# Patient Record
Sex: MALE | Race: WHITE | Employment: FULL TIME | URBAN - METROPOLITAN AREA
[De-identification: names, ages, dates, MRNs, and addresses within clinical notes are randomized per-mention and may not be internally consistent; named-entity substitution may affect disease eponyms.]

---

## 2017-02-10 ENCOUNTER — TELEPHONE (OUTPATIENT)
Dept: CARDIOTHORACIC SURGERY | Age: 57
End: 2017-02-10

## 2017-02-10 ENCOUNTER — HOSPITAL ENCOUNTER (OUTPATIENT)
Dept: VASCULAR LAB | Age: 57
Discharge: HOME | End: 2017-02-10
Attending: SURGERY | Admitting: SURGERY

## 2017-02-10 DIAGNOSIS — I65.23 OCCLUSION AND STENOSIS OF BILATERAL CAROTID ARTERIES: ICD-10-CM

## 2017-02-10 DIAGNOSIS — I65.23 CAROTID ATHEROSCLEROSIS, BILATERAL: Primary | ICD-10-CM

## 2017-02-16 ENCOUNTER — TELEPHONE (OUTPATIENT)
Dept: VASCULAR SURGERY | Age: 57
End: 2017-02-16

## 2017-02-22 ENCOUNTER — OFFICE VISIT (OUTPATIENT)
Dept: PRIMARY CARE CLINIC | Age: 57
End: 2017-02-22

## 2017-02-22 VITALS
SYSTOLIC BLOOD PRESSURE: 130 MMHG | HEART RATE: 63 BPM | TEMPERATURE: 98.3 F | BODY MASS INDEX: 30.7 KG/M2 | WEIGHT: 196 LBS | DIASTOLIC BLOOD PRESSURE: 70 MMHG | RESPIRATION RATE: 18 BRPM | OXYGEN SATURATION: 95 %

## 2017-02-22 DIAGNOSIS — I25.10 CORONARY ARTERY DISEASE WITHOUT ANGINA PECTORIS, UNSPECIFIED VESSEL OR LESION TYPE, UNSPECIFIED WHETHER NATIVE OR TRANSPLANTED HEART: ICD-10-CM

## 2017-02-22 DIAGNOSIS — E78.2 MIXED HYPERLIPIDEMIA: ICD-10-CM

## 2017-02-22 DIAGNOSIS — F17.200 SMOKER: ICD-10-CM

## 2017-02-22 DIAGNOSIS — G44.52 NEW DAILY PERSISTENT HEADACHE: Primary | ICD-10-CM

## 2017-02-22 PROCEDURE — 99214 OFFICE O/P EST MOD 30 MIN: CPT | Performed by: INTERNAL MEDICINE

## 2017-02-22 RX ORDER — DOXYCYCLINE HYCLATE 100 MG
100 TABLET ORAL 2 TIMES DAILY
Qty: 20 TABLET | Refills: 0 | Status: SHIPPED | OUTPATIENT
Start: 2017-02-22 | End: 2017-03-04

## 2017-02-22 RX ORDER — DOXYCYCLINE HYCLATE 100 MG
100 TABLET ORAL 2 TIMES DAILY
Qty: 20 TABLET | Refills: 0 | Status: SHIPPED | OUTPATIENT
Start: 2017-02-22 | End: 2017-02-22 | Stop reason: SDUPTHER

## 2017-02-22 RX ORDER — FLUTICASONE PROPIONATE 50 MCG
1 SPRAY, SUSPENSION (ML) NASAL DAILY
Qty: 1 BOTTLE | Refills: 0 | Status: SHIPPED | OUTPATIENT
Start: 2017-02-22 | End: 2017-04-03 | Stop reason: SDUPTHER

## 2017-02-22 RX ORDER — FLUTICASONE PROPIONATE 50 MCG
1 SPRAY, SUSPENSION (ML) NASAL DAILY
Qty: 1 BOTTLE | Refills: 0 | Status: SHIPPED | OUTPATIENT
Start: 2017-02-22 | End: 2017-02-22 | Stop reason: SDUPTHER

## 2017-02-22 ASSESSMENT — ENCOUNTER SYMPTOMS
CHEST TIGHTNESS: 0
EYES NEGATIVE: 1
BLOOD IN STOOL: 0
WHEEZING: 0
GASTROINTESTINAL NEGATIVE: 1
CONSTIPATION: 0
DIARRHEA: 0
SHORTNESS OF BREATH: 0
COUGH: 0

## 2017-02-22 ASSESSMENT — PATIENT HEALTH QUESTIONNAIRE - PHQ9
SUM OF ALL RESPONSES TO PHQ QUESTIONS 1-9: 0
SUM OF ALL RESPONSES TO PHQ9 QUESTIONS 1 & 2: 0
1. LITTLE INTEREST OR PLEASURE IN DOING THINGS: 0
2. FEELING DOWN, DEPRESSED OR HOPELESS: 0

## 2017-03-21 RX ORDER — ESCITALOPRAM OXALATE 20 MG/1
TABLET ORAL
Qty: 30 TABLET | Refills: 0 | Status: SHIPPED | OUTPATIENT
Start: 2017-03-21 | End: 2017-04-25 | Stop reason: SDUPTHER

## 2017-04-03 DIAGNOSIS — G44.52 NEW DAILY PERSISTENT HEADACHE: ICD-10-CM

## 2017-04-03 RX ORDER — AMOXICILLIN 500 MG/1
500 CAPSULE ORAL 3 TIMES DAILY
Qty: 30 CAPSULE | Refills: 0 | Status: SHIPPED | OUTPATIENT
Start: 2017-04-03 | End: 2017-04-13

## 2017-04-03 RX ORDER — FLUTICASONE PROPIONATE 50 MCG
1 SPRAY, SUSPENSION (ML) NASAL DAILY
Qty: 1 BOTTLE | Refills: 0 | Status: SHIPPED | OUTPATIENT
Start: 2017-04-03 | End: 2020-01-09 | Stop reason: ALTCHOICE

## 2017-04-25 RX ORDER — ESCITALOPRAM OXALATE 20 MG/1
TABLET ORAL
Qty: 30 TABLET | Refills: 0 | Status: SHIPPED | OUTPATIENT
Start: 2017-04-25 | End: 2017-05-29 | Stop reason: SDUPTHER

## 2017-05-30 RX ORDER — ESCITALOPRAM OXALATE 20 MG/1
TABLET ORAL
Qty: 30 TABLET | Refills: 0 | Status: SHIPPED | OUTPATIENT
Start: 2017-05-30 | End: 2017-06-28 | Stop reason: SDUPTHER

## 2017-06-06 DIAGNOSIS — E78.5 HYPERLIPIDEMIA: ICD-10-CM

## 2017-06-08 RX ORDER — EZETIMIBE 10 MG/1
TABLET ORAL
Qty: 90 TABLET | Refills: 0 | Status: SHIPPED | OUTPATIENT
Start: 2017-06-08 | End: 2017-09-20 | Stop reason: SDUPTHER

## 2017-06-19 DIAGNOSIS — E78.5 HYPERLIPIDEMIA: ICD-10-CM

## 2017-06-19 RX ORDER — METFORMIN HYDROCHLORIDE 500 MG/1
TABLET, EXTENDED RELEASE ORAL
Qty: 30 TABLET | Refills: 3 | Status: SHIPPED | OUTPATIENT
Start: 2017-06-19 | End: 2018-02-14 | Stop reason: SDUPTHER

## 2017-06-20 RX ORDER — EZETIMIBE 10 MG/1
TABLET ORAL
Qty: 90 TABLET | Refills: 0 | Status: SHIPPED | OUTPATIENT
Start: 2017-06-20 | End: 2017-09-20

## 2017-06-21 DIAGNOSIS — E78.5 HYPERLIPIDEMIA: ICD-10-CM

## 2017-06-22 RX ORDER — EZETIMIBE 10 MG/1
TABLET ORAL
Qty: 90 TABLET | Refills: 0 | Status: SHIPPED | OUTPATIENT
Start: 2017-06-22 | End: 2020-01-09 | Stop reason: SDUPTHER

## 2017-06-28 RX ORDER — ESCITALOPRAM OXALATE 20 MG/1
TABLET ORAL
Qty: 30 TABLET | Refills: 0 | Status: SHIPPED | OUTPATIENT
Start: 2017-06-28 | End: 2017-07-27 | Stop reason: SDUPTHER

## 2017-07-28 RX ORDER — ESCITALOPRAM OXALATE 20 MG/1
TABLET ORAL
Qty: 30 TABLET | Refills: 0 | Status: SHIPPED | OUTPATIENT
Start: 2017-07-28 | End: 2017-08-22 | Stop reason: SDUPTHER

## 2017-08-09 DIAGNOSIS — F41.9 ANXIETY AND DEPRESSION: ICD-10-CM

## 2017-08-09 DIAGNOSIS — F32.A ANXIETY AND DEPRESSION: ICD-10-CM

## 2017-08-09 RX ORDER — ESCITALOPRAM OXALATE 10 MG/1
10 TABLET ORAL DAILY
Qty: 90 TABLET | Refills: 3 | Status: SHIPPED | OUTPATIENT
Start: 2017-08-09 | End: 2017-08-22 | Stop reason: DRUGHIGH

## 2017-08-09 RX ORDER — ESCITALOPRAM OXALATE 20 MG/1
20 TABLET ORAL DAILY
Qty: 90 TABLET | Refills: 5 | Status: SHIPPED | OUTPATIENT
Start: 2017-08-09 | End: 2018-12-24

## 2017-08-10 DIAGNOSIS — F32.A ANXIETY AND DEPRESSION: ICD-10-CM

## 2017-08-10 DIAGNOSIS — F41.9 ANXIETY AND DEPRESSION: ICD-10-CM

## 2017-08-10 RX ORDER — ESCITALOPRAM OXALATE 10 MG/1
TABLET ORAL
Qty: 90 TABLET | Refills: 1 | OUTPATIENT
Start: 2017-08-10

## 2017-08-15 DIAGNOSIS — F32.A ANXIETY AND DEPRESSION: ICD-10-CM

## 2017-08-15 DIAGNOSIS — F41.9 ANXIETY AND DEPRESSION: ICD-10-CM

## 2017-08-16 RX ORDER — ESCITALOPRAM OXALATE 10 MG/1
TABLET ORAL
Qty: 90 TABLET | Refills: 1 | OUTPATIENT
Start: 2017-08-16

## 2017-09-11 LAB
A/G RATIO: 1.5 (ref 1.1–2.2)
ALBUMIN SERPL-MCNC: 4.3 G/DL (ref 3.4–5)
ALP BLD-CCNC: 78 U/L (ref 40–129)
ALT SERPL-CCNC: 14 U/L (ref 10–40)
ANION GAP SERPL CALCULATED.3IONS-SCNC: 23 MMOL/L (ref 3–16)
AST SERPL-CCNC: 15 U/L (ref 15–37)
BASOPHILS ABSOLUTE: 0.1 K/UL (ref 0–0.2)
BASOPHILS RELATIVE PERCENT: 1.2 %
BILIRUB SERPL-MCNC: 0.3 MG/DL (ref 0–1)
BUN BLDV-MCNC: 23 MG/DL (ref 7–20)
CALCIUM SERPL-MCNC: 9.6 MG/DL (ref 8.3–10.6)
CHLORIDE BLD-SCNC: 102 MMOL/L (ref 99–110)
CO2: 17 MMOL/L (ref 21–32)
CREAT SERPL-MCNC: 0.9 MG/DL (ref 0.9–1.3)
EOSINOPHILS ABSOLUTE: 0.2 K/UL (ref 0–0.6)
EOSINOPHILS RELATIVE PERCENT: 2 %
GFR AFRICAN AMERICAN: >60
GFR NON-AFRICAN AMERICAN: >60
GLOBULIN: 2.8 G/DL
GLUCOSE BLD-MCNC: 144 MG/DL (ref 70–99)
HCT VFR BLD CALC: 45.4 % (ref 40.5–52.5)
HEMOGLOBIN: 15.4 G/DL (ref 13.5–17.5)
LYMPHOCYTES ABSOLUTE: 2.9 K/UL (ref 1–5.1)
LYMPHOCYTES RELATIVE PERCENT: 31.2 %
MCH RBC QN AUTO: 32.3 PG (ref 26–34)
MCHC RBC AUTO-ENTMCNC: 33.8 G/DL (ref 31–36)
MCV RBC AUTO: 95.5 FL (ref 80–100)
MONOCYTES ABSOLUTE: 0.9 K/UL (ref 0–1.3)
MONOCYTES RELATIVE PERCENT: 9.5 %
NEUTROPHILS ABSOLUTE: 5.3 K/UL (ref 1.7–7.7)
NEUTROPHILS RELATIVE PERCENT: 56.1 %
PDW BLD-RTO: 14.9 % (ref 12.4–15.4)
PLATELET # BLD: 255 K/UL (ref 135–450)
PMV BLD AUTO: 7.8 FL (ref 5–10.5)
POTASSIUM SERPL-SCNC: 4.1 MMOL/L (ref 3.5–5.1)
RBC # BLD: 4.75 M/UL (ref 4.2–5.9)
SODIUM BLD-SCNC: 142 MMOL/L (ref 136–145)
TOTAL PROTEIN: 7.1 G/DL (ref 6.4–8.2)
WBC # BLD: 9.4 K/UL (ref 4–11)

## 2017-09-12 LAB
ESTIMATED AVERAGE GLUCOSE: 168.6 MG/DL
HBA1C MFR BLD: 7.5 %

## 2017-09-20 ENCOUNTER — OFFICE VISIT (OUTPATIENT)
Dept: PRIMARY CARE CLINIC | Age: 57
End: 2017-09-20

## 2017-09-20 VITALS
HEART RATE: 67 BPM | RESPIRATION RATE: 16 BRPM | DIASTOLIC BLOOD PRESSURE: 71 MMHG | WEIGHT: 199 LBS | OXYGEN SATURATION: 97 % | TEMPERATURE: 98.2 F | BODY MASS INDEX: 31.23 KG/M2 | SYSTOLIC BLOOD PRESSURE: 112 MMHG | HEIGHT: 67 IN

## 2017-09-20 DIAGNOSIS — F41.9 ANXIETY: ICD-10-CM

## 2017-09-20 DIAGNOSIS — I25.10 CORONARY ARTERY DISEASE WITHOUT ANGINA PECTORIS, UNSPECIFIED VESSEL OR LESION TYPE, UNSPECIFIED WHETHER NATIVE OR TRANSPLANTED HEART: ICD-10-CM

## 2017-09-20 DIAGNOSIS — I10 ESSENTIAL HYPERTENSION: Primary | ICD-10-CM

## 2017-09-20 DIAGNOSIS — E78.2 MIXED HYPERLIPIDEMIA: ICD-10-CM

## 2017-09-20 DIAGNOSIS — E11.9 TYPE 2 DIABETES MELLITUS WITHOUT COMPLICATION, WITHOUT LONG-TERM CURRENT USE OF INSULIN (HCC): ICD-10-CM

## 2017-09-20 PROCEDURE — 99214 OFFICE O/P EST MOD 30 MIN: CPT | Performed by: NURSE PRACTITIONER

## 2017-09-20 RX ORDER — ASPIRIN 81 MG/1
81 TABLET ORAL DAILY
Qty: 90 TABLET | Refills: 0 | Status: SHIPPED | OUTPATIENT
Start: 2017-09-20 | End: 2017-10-15 | Stop reason: SDUPTHER

## 2017-09-20 RX ORDER — ROSUVASTATIN CALCIUM 40 MG/1
40 TABLET, COATED ORAL EVERY EVENING
Qty: 90 TABLET | Refills: 0 | Status: SHIPPED | OUTPATIENT
Start: 2017-09-20 | End: 2018-05-02 | Stop reason: SDUPTHER

## 2017-09-20 RX ORDER — ESCITALOPRAM OXALATE 10 MG/1
10 TABLET ORAL DAILY
Qty: 90 TABLET | Refills: 0 | Status: SHIPPED | OUTPATIENT
Start: 2017-09-20 | End: 2017-12-15 | Stop reason: SDUPTHER

## 2017-09-20 RX ORDER — METFORMIN HYDROCHLORIDE 500 MG/1
1000 TABLET, EXTENDED RELEASE ORAL
Qty: 180 TABLET | Refills: 0 | Status: SHIPPED | OUTPATIENT
Start: 2017-09-20 | End: 2018-05-31 | Stop reason: SDUPTHER

## 2017-09-20 RX ORDER — CLOPIDOGREL BISULFATE 75 MG/1
75 TABLET ORAL DAILY
Qty: 90 TABLET | Refills: 0 | Status: SHIPPED | OUTPATIENT
Start: 2017-09-20 | End: 2017-10-15 | Stop reason: SDUPTHER

## 2017-09-20 RX ORDER — EZETIMIBE 10 MG/1
TABLET ORAL
Qty: 90 TABLET | Refills: 0 | Status: SHIPPED | OUTPATIENT
Start: 2017-09-20 | End: 2018-02-07 | Stop reason: SDUPTHER

## 2017-09-20 RX ORDER — ESCITALOPRAM OXALATE 20 MG/1
TABLET ORAL
Qty: 90 TABLET | Refills: 0 | Status: SHIPPED | OUTPATIENT
Start: 2017-09-20 | End: 2018-12-24

## 2017-09-20 RX ORDER — ATENOLOL 50 MG/1
50 TABLET ORAL DAILY
Qty: 90 TABLET | Refills: 0 | Status: SHIPPED | OUTPATIENT
Start: 2017-09-20 | End: 2017-10-15 | Stop reason: SDUPTHER

## 2017-09-20 ASSESSMENT — ENCOUNTER SYMPTOMS
CHEST TIGHTNESS: 0
WHEEZING: 0
PHOTOPHOBIA: 0
EYES NEGATIVE: 1
SHORTNESS OF BREATH: 0
BLOOD IN STOOL: 0
GASTROINTESTINAL NEGATIVE: 1
DIARRHEA: 0
COUGH: 0
CONSTIPATION: 0

## 2017-10-05 DIAGNOSIS — F41.9 ANXIETY AND DEPRESSION: ICD-10-CM

## 2017-10-05 DIAGNOSIS — F32.A ANXIETY AND DEPRESSION: ICD-10-CM

## 2017-10-06 ENCOUNTER — HOSPITAL ENCOUNTER (OUTPATIENT)
Dept: VASCULAR LAB | Age: 57
Discharge: HOME | End: 2017-10-06
Attending: SURGERY | Admitting: SURGERY

## 2017-10-06 ENCOUNTER — OFFICE VISIT (OUTPATIENT)
Dept: VASCULAR SURGERY | Age: 57
End: 2017-10-06

## 2017-10-06 VITALS
DIASTOLIC BLOOD PRESSURE: 70 MMHG | HEIGHT: 67 IN | WEIGHT: 199 LBS | SYSTOLIC BLOOD PRESSURE: 136 MMHG | BODY MASS INDEX: 31.23 KG/M2

## 2017-10-06 DIAGNOSIS — I65.23 CAROTID ATHEROSCLEROSIS, BILATERAL: Primary | ICD-10-CM

## 2017-10-06 DIAGNOSIS — I65.23 OCCLUSION AND STENOSIS OF BILATERAL CAROTID ARTERIES: ICD-10-CM

## 2017-10-06 PROCEDURE — 99999 PR OFFICE/OUTPT VISIT,PROCEDURE ONLY: CPT | Performed by: SURGERY

## 2017-10-06 NOTE — MR AVS SNAPSHOT
After Visit Summary             Germaine Lozada   10/6/2017 9:30 AM   Office Visit    Description:  Male : 1960   Provider:  Dionisio Haddad MD   Department:  [facility] FF VASC & ENDO              Your Follow-Up and Future Appointments         Below is a list of your follow-up and future appointments. This may not be a complete list as you may have made appointments directly with providers that we are not aware of or your providers may have made some for you. Please call your providers to confirm appointments. It is important to keep your appointments. Please bring your current insurance card, photo ID, co-pay, and all medication bottles to your appointment. If self-pay, payment is expected at the time of service. Your To-Do List     Future Orders Complete By Expires    VL DUP CAROTID BILATERAL [20287 Custom]  2018 10/6/2018         Information from Your Visit        Department     Name Address Phone Fax    [facility] [address] [phone]      You Were Seen for:         Comments    Carotid atherosclerosis, bilateral   [[id number]]         Vital Signs     Blood Pressure Height Weight Body Mass Index Smoking Status       136/70 5' 7\" (1.702 m) 199 lb (90.3 kg) 31.17 kg/m2 Current Every Day Smoker       Additional Information about your Body Mass Index (BMI)           Your BMI as listed above is considered obese (30 or more). BMI is an estimate of body fat, calculated from your height and weight. The higher your BMI, the greater your risk of heart disease, high blood pressure, type 2 diabetes, stroke, gallstones, arthritis, sleep apnea, and certain cancers. BMI is not perfect. It may overestimate body fat in athletes and people who are more muscular.   Even a small weight loss (between 5 and 10 percent of your current weight) by decreasing your calorie intake and becoming more physically active will help lower your risk of developing or factors born between [facility] at least once (lifetime) who have never been tested. 1960    Diabetic Foot Exam 12/22/1972    Eye Exam By An Eye Doctor 12/22/1972    HIV screening is recommended for all people regardless of risk factors  aged 15-65 years at least once (lifetime) who have never been HIV tested. 12/22/1977    Urine Check For Kidney Problems 12/22/1980    Tetanus Combination Vaccine (1 - Tdap) 12/22/1981    Pneumococcal Vaccine - Pneumovax for adults aged 19-64 years with: chronic heart disease, chronic lung disease, diabetes mellitus, alcoholism, chronic liver disease, or cigarette smoking. (1 of 1 - PPSV23) 12/22/1981    Cholesterol Screening 5/23/2017    Colon Cancer Stool Test 5/24/2017    Yearly Flu Vaccine (1) 9/1/2017    Hemoglobin A1C (Test For Long-Term Glucose Control) 9/11/2018            [employer] Signup           [employer]ageCrowd allows you to send messages to your doctor, view your test results, renew your prescriptions, schedule appointments, view visit notes, and more. How Do I Sign Up? 1. In your Internet browser, go to [email or web address]  2. Click on the Sign Up Now link in the Sign In box. You will see the New Member Sign Up page. 3. Enter your sageCrowd Access Code exactly as it appears below. You will not need to use this code after youve completed the sign-up process. If you do not sign up before the expiration date, you must request a new code. [employer]d Access Code: [email or web address]  Expires: 12/5/2017 10:00 AM    4. Enter your Social Security Number ([id number]) and Date of Birth (mm/dd/yyyy) as indicated and click Submit. You will be taken to the next sign-up page. 5. Create a sageCrowd ID. This will be your sageCrowd login ID and cannot be changed, so think of one that is secure and easy to remember. 6. Create a [employer]d password. You can change your password at any time. 7. Enter your Password Reset Question and Answer.  This can be used at a

## 2017-10-06 NOTE — PROGRESS NOTES
[facility])   Vascular Surgery Followup    Referring Provider:  James Smith MD     Chief Complaint   Patient presents with    Follow-up        History of Present Illness:   29-year-old male with history of left subclavian stenosis and stent placement in September 2011. He also has a known history of carotid atherosclerosis. Unable to obtain ultrasound secondary to insurance regulations. No new complaints. Past Medical History:   has a past medical history of Anxiety; CAD (coronary artery disease); Hyperlipidemia; Hypertension; and PVD (peripheral vascular disease) ([address] Surgical History:   has a past surgical history that includes Coronary angioplasty. Social History:   reports that he has been smoking. He has a 10.00 pack-year smoking history. He uses smokeless tobacco. He reports that he does not drink alcohol or use illicit drugs. Family History:  family history includes Heart Disease in his father; High Blood Pressure in his father and mother; High Cholesterol in his father.      Home Medications:  Current Outpatient Prescriptions   Medication Sig Dispense Refill    escitalopram (LEXAPRO) 20 MG tablet TAKE 1 TABLET BY MOUTH DAILY WITH A 10 MG TABLET TO EQUAL 30 MG DAILY 90 tablet 0    escitalopram (LEXAPRO) 10 MG tablet Take 1 tablet by mouth daily TAKE 1 TABLET BY MOUTH DAILY WITH A 20 MG TABLET TO EQUAL 30 MG DAILY 90 tablet 0    rosuvastatin (CRESTOR) 40 MG tablet Take 1 tablet by mouth every evening 90 tablet 0    aspirin 81 MG EC tablet Take 1 tablet by mouth daily 90 tablet 0    atenolol (TENORMIN) 50 MG tablet Take 1 tablet by mouth daily 90 tablet 0    ezetimibe (ZETIA) 10 MG tablet TAKE 1 TABLET BY MOUTH EVERY DAY 90 tablet 0    metFORMIN (GLUCOPHAGE XR) 500 MG extended release tablet Take 2 tablets by mouth daily (with breakfast) 180 tablet 0    clopidogrel (PLAVIX) 75 MG tablet Take 1 tablet by mouth daily 90 tablet 0    escitalopram (LEXAPRO) 20 MG tablet Take 1

## 2017-10-15 DIAGNOSIS — I25.10 CORONARY ARTERY DISEASE WITHOUT ANGINA PECTORIS, UNSPECIFIED VESSEL OR LESION TYPE, UNSPECIFIED WHETHER NATIVE OR TRANSPLANTED HEART: ICD-10-CM

## 2017-10-15 DIAGNOSIS — I10 ESSENTIAL HYPERTENSION: ICD-10-CM

## 2017-10-16 RX ORDER — ASPIRIN 81 MG/1
TABLET ORAL
Qty: 90 TABLET | Refills: 2 | Status: SHIPPED | OUTPATIENT
Start: 2017-10-16 | End: 2018-08-29 | Stop reason: SDUPTHER

## 2017-10-16 RX ORDER — CLOPIDOGREL BISULFATE 75 MG/1
TABLET ORAL
Qty: 90 TABLET | Refills: 2 | Status: SHIPPED | OUTPATIENT
Start: 2017-10-16 | End: 2018-12-06 | Stop reason: SDUPTHER

## 2017-10-16 RX ORDER — ATENOLOL 50 MG/1
TABLET ORAL
Qty: 90 TABLET | Refills: 2 | Status: SHIPPED | OUTPATIENT
Start: 2017-10-16 | End: 2018-06-29 | Stop reason: SDUPTHER

## 2017-12-15 DIAGNOSIS — F41.9 ANXIETY: Primary | ICD-10-CM

## 2017-12-18 RX ORDER — ESCITALOPRAM OXALATE 10 MG/1
TABLET ORAL
Qty: 90 TABLET | Refills: 0 | Status: SHIPPED | OUTPATIENT
Start: 2017-12-18 | End: 2018-03-18 | Stop reason: SDUPTHER

## 2017-12-18 RX ORDER — ESCITALOPRAM OXALATE 20 MG/1
TABLET ORAL
Qty: 90 TABLET | Refills: 0 | Status: SHIPPED | OUTPATIENT
Start: 2017-12-18 | End: 2018-03-18 | Stop reason: SDUPTHER

## 2018-02-07 DIAGNOSIS — E78.2 MIXED HYPERLIPIDEMIA: ICD-10-CM

## 2018-02-14 RX ORDER — METFORMIN HYDROCHLORIDE 500 MG/1
500 TABLET, EXTENDED RELEASE ORAL
Qty: 90 TABLET | Refills: 1 | Status: SHIPPED | OUTPATIENT
Start: 2018-02-14 | End: 2020-01-09 | Stop reason: SDUPTHER

## 2018-02-14 RX ORDER — EZETIMIBE 10 MG/1
TABLET ORAL
Qty: 90 TABLET | Refills: 0 | Status: SHIPPED | OUTPATIENT
Start: 2018-02-14 | End: 2018-05-31 | Stop reason: SDUPTHER

## 2018-02-14 NOTE — TELEPHONE ENCOUNTER
Requested Prescriptions     Pending Prescriptions Disp Refills    metFORMIN (GLUCOPHAGE-XR) 500 MG extended release tablet 30 tablet 3     Last Fill Date: 6/19/17   R:3  Last OV: 9/20/17

## 2018-03-18 DIAGNOSIS — F41.9 ANXIETY: ICD-10-CM

## 2018-03-20 RX ORDER — ESCITALOPRAM OXALATE 20 MG/1
TABLET ORAL
Qty: 90 TABLET | Refills: 0 | Status: SHIPPED | OUTPATIENT
Start: 2018-03-20 | End: 2018-06-20 | Stop reason: SDUPTHER

## 2018-03-20 RX ORDER — ESCITALOPRAM OXALATE 10 MG/1
TABLET ORAL
Qty: 90 TABLET | Refills: 0 | Status: SHIPPED | OUTPATIENT
Start: 2018-03-20 | End: 2018-06-20 | Stop reason: SDUPTHER

## 2018-05-02 DIAGNOSIS — I25.10 CORONARY ARTERY DISEASE WITHOUT ANGINA PECTORIS, UNSPECIFIED VESSEL OR LESION TYPE, UNSPECIFIED WHETHER NATIVE OR TRANSPLANTED HEART: ICD-10-CM

## 2018-05-02 RX ORDER — ROSUVASTATIN CALCIUM 40 MG/1
TABLET, COATED ORAL
Qty: 90 TABLET | Refills: 0 | Status: SHIPPED | OUTPATIENT
Start: 2018-05-02 | End: 2018-08-29 | Stop reason: SDUPTHER

## 2018-05-04 ENCOUNTER — HOSPITAL ENCOUNTER (OUTPATIENT)
Dept: VASCULAR LAB | Age: 58
Discharge: HOME | End: 2018-05-04
Attending: SURGERY | Admitting: SURGERY

## 2018-05-04 ENCOUNTER — OFFICE VISIT (OUTPATIENT)
Dept: VASCULAR SURGERY | Age: 58
End: 2018-05-04

## 2018-05-04 VITALS
HEIGHT: 67 IN | SYSTOLIC BLOOD PRESSURE: 138 MMHG | WEIGHT: 197 LBS | BODY MASS INDEX: 30.92 KG/M2 | DIASTOLIC BLOOD PRESSURE: 72 MMHG

## 2018-05-04 DIAGNOSIS — I65.23 CAROTID ATHEROSCLEROSIS, BILATERAL: ICD-10-CM

## 2018-05-04 DIAGNOSIS — I65.23 OCCLUSION AND STENOSIS OF BILATERAL CAROTID ARTERIES: ICD-10-CM

## 2018-05-04 DIAGNOSIS — I65.23 CAROTID ATHEROSCLEROSIS, BILATERAL: Primary | ICD-10-CM

## 2018-05-04 PROCEDURE — 99213 OFFICE O/P EST LOW 20 MIN: CPT | Performed by: SURGERY

## 2018-05-31 DIAGNOSIS — E78.2 MIXED HYPERLIPIDEMIA: ICD-10-CM

## 2018-05-31 RX ORDER — EZETIMIBE 10 MG/1
TABLET ORAL
Qty: 90 TABLET | Refills: 3 | Status: SHIPPED | OUTPATIENT
Start: 2018-05-31 | End: 2018-08-29 | Stop reason: SDUPTHER

## 2018-05-31 RX ORDER — METFORMIN HYDROCHLORIDE 500 MG/1
TABLET, EXTENDED RELEASE ORAL
Qty: 180 TABLET | Refills: 3 | Status: SHIPPED | OUTPATIENT
Start: 2018-05-31 | End: 2018-08-29 | Stop reason: SDUPTHER

## 2018-06-20 DIAGNOSIS — F41.9 ANXIETY: ICD-10-CM

## 2018-06-20 RX ORDER — ESCITALOPRAM OXALATE 10 MG/1
TABLET ORAL
Qty: 90 TABLET | Refills: 0 | Status: SHIPPED | OUTPATIENT
Start: 2018-06-20 | End: 2018-08-29 | Stop reason: SDUPTHER

## 2018-06-20 RX ORDER — ESCITALOPRAM OXALATE 20 MG/1
TABLET ORAL
Qty: 90 TABLET | Refills: 0 | Status: SHIPPED | OUTPATIENT
Start: 2018-06-20 | End: 2018-08-29 | Stop reason: SDUPTHER

## 2018-06-29 DIAGNOSIS — I10 ESSENTIAL HYPERTENSION: ICD-10-CM

## 2018-06-29 DIAGNOSIS — I25.10 CORONARY ARTERY DISEASE WITHOUT ANGINA PECTORIS, UNSPECIFIED VESSEL OR LESION TYPE, UNSPECIFIED WHETHER NATIVE OR TRANSPLANTED HEART: ICD-10-CM

## 2018-07-05 NOTE — TELEPHONE ENCOUNTER
Last ov: 09/20/17  No upcoming, should have returned 12/2017 for 3 month follow up    meds pended    Requested Prescriptions     Pending Prescriptions Disp Refills    atenolol (TENORMIN) 50 MG tablet [Pharmacy Med Name: ATENOLOL 50 MG TABLET] 90 tablet 2     Sig: TAKE 1 TABLET BY MOUTH EVERY DAY

## 2018-07-09 RX ORDER — ATENOLOL 50 MG/1
TABLET ORAL
Qty: 90 TABLET | Refills: 2 | Status: SHIPPED | OUTPATIENT
Start: 2018-07-09 | End: 2018-08-29 | Stop reason: SDUPTHER

## 2018-08-29 ENCOUNTER — OFFICE VISIT (OUTPATIENT)
Dept: PRIMARY CARE CLINIC | Age: 58
End: 2018-08-29

## 2018-08-29 VITALS
DIASTOLIC BLOOD PRESSURE: 80 MMHG | OXYGEN SATURATION: 97 % | HEART RATE: 68 BPM | WEIGHT: 196 LBS | HEIGHT: 67 IN | SYSTOLIC BLOOD PRESSURE: 120 MMHG | TEMPERATURE: 97.2 F | BODY MASS INDEX: 30.76 KG/M2

## 2018-08-29 DIAGNOSIS — F17.200 SMOKER: ICD-10-CM

## 2018-08-29 DIAGNOSIS — Z12.11 SCREEN FOR COLON CANCER: ICD-10-CM

## 2018-08-29 DIAGNOSIS — F41.9 ANXIETY: ICD-10-CM

## 2018-08-29 DIAGNOSIS — Z00.00 PHYSICAL EXAM: ICD-10-CM

## 2018-08-29 DIAGNOSIS — E11.9 TYPE 2 DIABETES MELLITUS WITHOUT COMPLICATION, WITHOUT LONG-TERM CURRENT USE OF INSULIN (HCC): ICD-10-CM

## 2018-08-29 DIAGNOSIS — Z23 NEED FOR SHINGLES VACCINE: ICD-10-CM

## 2018-08-29 DIAGNOSIS — Z23 NEED FOR PNEUMOCOCCAL VACCINATION: ICD-10-CM

## 2018-08-29 DIAGNOSIS — I10 ESSENTIAL HYPERTENSION: ICD-10-CM

## 2018-08-29 DIAGNOSIS — E78.2 MIXED HYPERLIPIDEMIA: ICD-10-CM

## 2018-08-29 DIAGNOSIS — Z23 NEED FOR DIPHTHERIA-TETANUS-PERTUSSIS (TDAP) VACCINE: ICD-10-CM

## 2018-08-29 DIAGNOSIS — I25.10 CORONARY ARTERY DISEASE WITHOUT ANGINA PECTORIS, UNSPECIFIED VESSEL OR LESION TYPE, UNSPECIFIED WHETHER NATIVE OR TRANSPLANTED HEART: ICD-10-CM

## 2018-08-29 LAB
A/G RATIO: 1.8 (ref 1.1–2.2)
ALBUMIN SERPL-MCNC: 4.8 G/DL (ref 3.4–5)
ALP BLD-CCNC: 80 U/L (ref 40–129)
ALT SERPL-CCNC: 18 U/L (ref 10–40)
ANION GAP SERPL CALCULATED.3IONS-SCNC: 12 MMOL/L (ref 3–16)
AST SERPL-CCNC: 15 U/L (ref 15–37)
BASOPHILS ABSOLUTE: 0.1 K/UL (ref 0–0.2)
BASOPHILS RELATIVE PERCENT: 1.2 %
BILIRUB SERPL-MCNC: 0.4 MG/DL (ref 0–1)
BUN BLDV-MCNC: 16 MG/DL (ref 7–20)
CALCIUM SERPL-MCNC: 9.9 MG/DL (ref 8.3–10.6)
CHLORIDE BLD-SCNC: 100 MMOL/L (ref 99–110)
CHOLESTEROL, TOTAL: 173 MG/DL (ref 0–199)
CO2: 27 MMOL/L (ref 21–32)
CREAT SERPL-MCNC: 1.1 MG/DL (ref 0.9–1.3)
EOSINOPHILS ABSOLUTE: 0.2 K/UL (ref 0–0.6)
EOSINOPHILS RELATIVE PERCENT: 2.8 %
GFR AFRICAN AMERICAN: >60
GFR NON-AFRICAN AMERICAN: >60
GLOBULIN: 2.6 G/DL
GLUCOSE BLD-MCNC: 196 MG/DL (ref 70–99)
HBA1C MFR BLD: 8.9 %
HCT VFR BLD CALC: 47.6 % (ref 40.5–52.5)
HDLC SERPL-MCNC: 40 MG/DL (ref 40–60)
HEMOGLOBIN: 16.2 G/DL (ref 13.5–17.5)
HEPATITIS C ANTIBODY INTERPRETATION: NORMAL
LDL CHOLESTEROL CALCULATED: 89 MG/DL
LYMPHOCYTES ABSOLUTE: 2.7 K/UL (ref 1–5.1)
LYMPHOCYTES RELATIVE PERCENT: 31.6 %
MCH RBC QN AUTO: 31.3 PG (ref 26–34)
MCHC RBC AUTO-ENTMCNC: 34 G/DL (ref 31–36)
MCV RBC AUTO: 92 FL (ref 80–100)
MONOCYTES ABSOLUTE: 0.7 K/UL (ref 0–1.3)
MONOCYTES RELATIVE PERCENT: 8.1 %
NEUTROPHILS ABSOLUTE: 4.7 K/UL (ref 1.7–7.7)
NEUTROPHILS RELATIVE PERCENT: 56.3 %
PDW BLD-RTO: 14.4 % (ref 12.4–15.4)
PLATELET # BLD: 237 K/UL (ref 135–450)
PMV BLD AUTO: 7.9 FL (ref 5–10.5)
POTASSIUM SERPL-SCNC: 5.1 MMOL/L (ref 3.5–5.1)
RBC # BLD: 5.18 M/UL (ref 4.2–5.9)
SODIUM BLD-SCNC: 139 MMOL/L (ref 136–145)
TOTAL PROTEIN: 7.4 G/DL (ref 6.4–8.2)
TRIGL SERPL-MCNC: 221 MG/DL (ref 0–150)
TSH SERPL DL<=0.05 MIU/L-ACNC: 1.46 UIU/ML (ref 0.27–4.2)
VLDLC SERPL CALC-MCNC: 44 MG/DL
WBC # BLD: 8.4 K/UL (ref 4–11)

## 2018-08-29 PROCEDURE — 90715 TDAP VACCINE 7 YRS/> IM: CPT | Performed by: INTERNAL MEDICINE

## 2018-08-29 PROCEDURE — 90472 IMMUNIZATION ADMIN EACH ADD: CPT | Performed by: INTERNAL MEDICINE

## 2018-08-29 PROCEDURE — 90471 IMMUNIZATION ADMIN: CPT | Performed by: INTERNAL MEDICINE

## 2018-08-29 PROCEDURE — 90732 PPSV23 VACC 2 YRS+ SUBQ/IM: CPT | Performed by: INTERNAL MEDICINE

## 2018-08-29 PROCEDURE — 83036 HEMOGLOBIN GLYCOSYLATED A1C: CPT | Performed by: INTERNAL MEDICINE

## 2018-08-29 PROCEDURE — 99396 PREV VISIT EST AGE 40-64: CPT | Performed by: INTERNAL MEDICINE

## 2018-08-29 RX ORDER — ROSUVASTATIN CALCIUM 40 MG/1
40 TABLET, COATED ORAL DAILY
Qty: 90 TABLET | Refills: 3 | Status: SHIPPED | OUTPATIENT
Start: 2018-08-29 | End: 2018-12-27 | Stop reason: SDUPTHER

## 2018-08-29 RX ORDER — EZETIMIBE 10 MG/1
10 TABLET ORAL DAILY
Qty: 90 TABLET | Refills: 3 | Status: SHIPPED | OUTPATIENT
Start: 2018-08-29 | End: 2018-12-27 | Stop reason: SDUPTHER

## 2018-08-29 RX ORDER — METFORMIN HYDROCHLORIDE 500 MG/1
1000 TABLET, EXTENDED RELEASE ORAL
Qty: 180 TABLET | Refills: 3 | Status: SHIPPED | OUTPATIENT
Start: 2018-08-29 | End: 2018-12-27 | Stop reason: SDUPTHER

## 2018-08-29 RX ORDER — ESCITALOPRAM OXALATE 20 MG/1
20 TABLET ORAL DAILY
Qty: 90 TABLET | Refills: 0 | Status: SHIPPED | OUTPATIENT
Start: 2018-08-29 | End: 2018-09-26 | Stop reason: SDUPTHER

## 2018-08-29 RX ORDER — ASPIRIN 81 MG/1
81 TABLET ORAL DAILY
Qty: 90 TABLET | Refills: 5 | Status: SHIPPED | OUTPATIENT
Start: 2018-08-29 | End: 2018-10-29 | Stop reason: SDUPTHER

## 2018-08-29 RX ORDER — ESCITALOPRAM OXALATE 10 MG/1
10 TABLET ORAL DAILY
Qty: 90 TABLET | Refills: 3 | Status: SHIPPED | OUTPATIENT
Start: 2018-08-29 | End: 2018-09-26 | Stop reason: SDUPTHER

## 2018-08-29 RX ORDER — ATENOLOL 50 MG/1
50 TABLET ORAL DAILY
Qty: 90 TABLET | Refills: 3 | Status: SHIPPED | OUTPATIENT
Start: 2018-08-29 | End: 2018-12-27 | Stop reason: SDUPTHER

## 2018-08-29 ASSESSMENT — ENCOUNTER SYMPTOMS
COUGH: 0
SHORTNESS OF BREATH: 0
BLOOD IN STOOL: 0
WHEEZING: 0
CONSTIPATION: 0
CHEST TIGHTNESS: 0
DIARRHEA: 0
GASTROINTESTINAL NEGATIVE: 1
EYES NEGATIVE: 1

## 2018-08-29 ASSESSMENT — PATIENT HEALTH QUESTIONNAIRE - PHQ9
2. FEELING DOWN, DEPRESSED OR HOPELESS: 0
1. LITTLE INTEREST OR PLEASURE IN DOING THINGS: 0
SUM OF ALL RESPONSES TO PHQ9 QUESTIONS 1 & 2: 0
SUM OF ALL RESPONSES TO PHQ QUESTIONS 1-9: 0
SUM OF ALL RESPONSES TO PHQ QUESTIONS 1-9: 0

## 2018-08-29 NOTE — PROGRESS NOTES
Subjective:      Patient ID: Percy Doan is a 56 y.o. male. 8/29/18 Patient presents with:  Check-Up            Last seen  2/22/17 Patient presents with:  Sinus Problem  On Mucinex + Afrin . S/p Amox   Headache  Diabetes                  Last seen 5/16./16 Patient presents with:  Hypertension    Last seen 4/15/15     S/P Stents . 8/11    Still smokes ! .    H/O chronic anxiety . Review of Systems   Constitutional: Negative for chills, fatigue and fever. . Refuses Flu vac   Td ? HENT:        Dental Exam  Reg    Eyes: Negative. Eye Ex  7/18   Respiratory: Negative for cough, chest tightness, shortness of breath and wheezing. Smokes 1/2 ppd     . No Etoh . No rec drugs . No asthma . Cardiovascular: Negative. Known CAD  ;S/P Stents 8/11 . Last exam 11/13 . Walks reg , no discomfort    Father with CAD at 36 ! Gastrointestinal: Negative. Negative for blood in stool, constipation and diarrhea. No FH of ca colon . Colonoscopy ? Endocrine:        Diabetes 2015   Genitourinary: Negative for dysuria, frequency and urgency. No FH of ca prostate    Musculoskeletal: Negative. Skin: Negative for rash. Neurological: Negative for dizziness, tremors, weakness and light-headedness. Psychiatric/Behavioral: Negative for sleep disturbance. The patient is nervous/anxious (under control with Lexapro). Objective:   Physical Exam   Constitutional: He is oriented to person, place, and time. He appears well-nourished. No distress. HENT:   Mouth/Throat: Oropharynx is clear and moist.   No tenderness    Eyes: Pupils are equal, round, and reactive to light. Conjunctivae and EOM are normal.   Neck: Neck supple. No thyromegaly present. Cardiovascular: Normal rate, regular rhythm and normal heart sounds. Pulmonary/Chest: Breath sounds normal.   Abdominal: He exhibits distension. There is no tenderness. Musculoskeletal: Normal range of motion. mouth daily  -     aspirin 81 MG EC tablet; Take 1 tablet by mouth daily  -     atenolol (TENORMIN) 50 MG tablet; Take 1 tablet by mouth daily    Smoker  Cautioned strongly  Risks of smoking reviewed and cessation options discussed  Give the patient information on Smoking Cessation and smoking cessation programs offered in the community. Explain effects smoking and second hand smoke have on the body. Encourage the patient to ask people that smoke around him/her to smoke outside, or in another room. Screen for colon cancer  -     POCT Fecal Immunochemical Test (FIT); Future  -     Sujit Metz MD (RIO)    Need for shingles vaccine  -     zoster recombinant adjuvanted vaccine (SHINGRIX) 50 MCG SUSR injection; Inject 0.5 mLs into the muscle once for 1 dose    Need for diphtheria-tetanus-pertussis (Tdap) vaccine    Need for pneumococcal vaccination            Bilateral carotid artery disease ([facility] [address] Sees Vascular for q 6 mth                  Plan:      Self Management Goals. Time spent with pt > 45 mins    Know which medication is for what condition:   Know side effects of medications, and discuss with doctor   Discuss side effects and instructions on new medications  Know correct dose/frequency of medications  Take medications at the same time each day  Stay current on medication refills  If taking OTC's check with MD/pharmacy first about interactions  Monitor sugars and record time/meal    LDL goal 790 or less  Systolic BP < or equal to 122  Diastolic BP < or equal to 85  Quota system, 10 number of cigarettes per day  Nicoderm and or Chantix   Advised  Flu and Pneumonia Vax  Set targets for weight loss 4 lbs per month  Exercise 3-5 times per week  Keep check of weight  Weighting machine    On a scale of 1 to 5 how confident are you in these goals?   5/5       Education materials given

## 2018-08-30 LAB
HIV AG/AB: NORMAL
HIV ANTIGEN: NORMAL
HIV-1 ANTIBODY: NORMAL
HIV-2 AB: NORMAL

## 2018-08-31 LAB
PROSTATE SPECIFIC ANTIGEN FREE: <0.1 UG/L
PROSTATE SPECIFIC ANTIGEN PERCENT FREE: 20 %
PROSTATE SPECIFIC ANTIGEN: 0.2 UG/L (ref 0–4)

## 2018-09-26 DIAGNOSIS — F41.9 ANXIETY: ICD-10-CM

## 2018-09-26 RX ORDER — ESCITALOPRAM OXALATE 10 MG/1
10 TABLET ORAL DAILY
Qty: 90 TABLET | Refills: 3 | Status: SHIPPED | OUTPATIENT
Start: 2018-09-26 | End: 2018-12-24

## 2018-09-26 RX ORDER — ESCITALOPRAM OXALATE 20 MG/1
20 TABLET ORAL DAILY
Qty: 90 TABLET | Refills: 0 | Status: SHIPPED | OUTPATIENT
Start: 2018-09-26 | End: 2018-12-22 | Stop reason: SDUPTHER

## 2018-10-29 DIAGNOSIS — E11.9 TYPE 2 DIABETES MELLITUS WITHOUT COMPLICATION, WITHOUT LONG-TERM CURRENT USE OF INSULIN (HCC): ICD-10-CM

## 2018-10-29 DIAGNOSIS — I25.10 CORONARY ARTERY DISEASE WITHOUT ANGINA PECTORIS, UNSPECIFIED VESSEL OR LESION TYPE, UNSPECIFIED WHETHER NATIVE OR TRANSPLANTED HEART: ICD-10-CM

## 2018-10-29 NOTE — TELEPHONE ENCOUNTER
Requested Prescriptions     Pending Prescriptions Disp Refills    aspirin 81 MG EC tablet [Pharmacy Med Name: ASPIRIN EC 81 MG TABLET] 90 tablet 2     Sig: TAKE 1 TABLET BY MOUTH EVERY DAY     8/29/18

## 2018-10-30 RX ORDER — ASPIRIN 81 MG/1
TABLET ORAL
Qty: 90 TABLET | Refills: 2 | Status: SHIPPED | OUTPATIENT
Start: 2018-10-30 | End: 2019-08-10 | Stop reason: SDUPTHER

## 2018-12-06 RX ORDER — CLOPIDOGREL BISULFATE 75 MG/1
75 TABLET ORAL DAILY
Qty: 90 TABLET | Refills: 5 | Status: SHIPPED | OUTPATIENT
Start: 2018-12-06 | End: 2019-03-14 | Stop reason: SDUPTHER

## 2018-12-22 DIAGNOSIS — F41.9 ANXIETY: ICD-10-CM

## 2018-12-24 RX ORDER — ESCITALOPRAM OXALATE 20 MG/1
TABLET ORAL
Qty: 90 TABLET | Refills: 0 | Status: SHIPPED | OUTPATIENT
Start: 2018-12-24 | End: 2018-12-27 | Stop reason: SDUPTHER

## 2018-12-27 ENCOUNTER — TELEPHONE (OUTPATIENT)
Dept: PRIMARY CARE CLINIC | Age: 58
End: 2018-12-27

## 2018-12-27 DIAGNOSIS — E11.9 TYPE 2 DIABETES MELLITUS WITHOUT COMPLICATION, WITHOUT LONG-TERM CURRENT USE OF INSULIN (HCC): ICD-10-CM

## 2018-12-27 DIAGNOSIS — I10 ESSENTIAL HYPERTENSION: ICD-10-CM

## 2018-12-27 DIAGNOSIS — I25.10 CORONARY ARTERY DISEASE WITHOUT ANGINA PECTORIS, UNSPECIFIED VESSEL OR LESION TYPE, UNSPECIFIED WHETHER NATIVE OR TRANSPLANTED HEART: ICD-10-CM

## 2018-12-27 DIAGNOSIS — E78.2 MIXED HYPERLIPIDEMIA: ICD-10-CM

## 2018-12-27 DIAGNOSIS — F41.9 ANXIETY: ICD-10-CM

## 2018-12-27 RX ORDER — ROSUVASTATIN CALCIUM 40 MG/1
40 TABLET, COATED ORAL DAILY
Qty: 90 TABLET | Refills: 3 | Status: SHIPPED | OUTPATIENT
Start: 2018-12-27 | End: 2020-01-09 | Stop reason: SDUPTHER

## 2018-12-27 RX ORDER — ATENOLOL 50 MG/1
50 TABLET ORAL DAILY
Qty: 90 TABLET | Refills: 3 | Status: SHIPPED | OUTPATIENT
Start: 2018-12-27 | End: 2020-01-09 | Stop reason: SDUPTHER

## 2018-12-27 RX ORDER — METFORMIN HYDROCHLORIDE 500 MG/1
1000 TABLET, EXTENDED RELEASE ORAL
Qty: 180 TABLET | Refills: 3 | Status: SHIPPED | OUTPATIENT
Start: 2018-12-27 | End: 2019-03-24 | Stop reason: SDUPTHER

## 2018-12-27 RX ORDER — ESCITALOPRAM OXALATE 20 MG/1
20 TABLET ORAL DAILY
Qty: 90 TABLET | Refills: 5 | Status: SHIPPED | OUTPATIENT
Start: 2018-12-27 | End: 2020-01-09 | Stop reason: SDUPTHER

## 2018-12-27 RX ORDER — EZETIMIBE 10 MG/1
10 TABLET ORAL DAILY
Qty: 90 TABLET | Refills: 3 | Status: SHIPPED | OUTPATIENT
Start: 2018-12-27 | End: 2020-01-09 | Stop reason: SDUPTHER

## 2019-02-01 DIAGNOSIS — Z12.11 COLON CANCER SCREENING: Primary | ICD-10-CM

## 2019-02-01 LAB
CONTROL: NORMAL
HEMOCCULT STL QL: NORMAL

## 2019-02-01 PROCEDURE — 82274 ASSAY TEST FOR BLOOD FECAL: CPT | Performed by: INTERNAL MEDICINE

## 2019-03-19 RX ORDER — CLOPIDOGREL BISULFATE 75 MG/1
TABLET ORAL
Qty: 90 TABLET | Refills: 2 | Status: SHIPPED | OUTPATIENT
Start: 2019-03-19 | End: 2020-03-06

## 2019-03-24 DIAGNOSIS — E11.9 TYPE 2 DIABETES MELLITUS WITHOUT COMPLICATION, WITHOUT LONG-TERM CURRENT USE OF INSULIN (HCC): ICD-10-CM

## 2019-03-26 RX ORDER — METFORMIN HYDROCHLORIDE 500 MG/1
TABLET, EXTENDED RELEASE ORAL
Qty: 90 TABLET | Refills: 0 | Status: SHIPPED | OUTPATIENT
Start: 2019-03-26 | End: 2020-01-09

## 2019-06-19 ENCOUNTER — TELEPHONE (OUTPATIENT)
Dept: VASCULAR SURGERY | Age: 59
End: 2019-06-19

## 2019-06-19 NOTE — TELEPHONE ENCOUNTER
I had spoke with Mr. Hali Pantoja earlier and explained the scheduling instructions for his bilateral carotid duplex ordered by Dr. Antonia Morse for surveillance of his carotid stenosis. He requested I leave a message on his mobile phone with the date and time. The scan is scheduled at [facility] on Friday, July 19, 2019. He is to report at 9:30 am, scan at 10:00 am, and office visit with Dr. Antonia Morse at 11:15 am.  He has our office telephone number and will call if he has any questions or conflicts.

## 2019-06-21 ENCOUNTER — TELEPHONE (OUTPATIENT)
Dept: VASCULAR SURGERY | Age: 59
End: 2019-06-21

## 2019-06-21 DIAGNOSIS — I65.23 BILATERAL CAROTID ARTERY STENOSIS: Primary | ICD-10-CM

## 2019-07-19 ENCOUNTER — OFFICE VISIT (OUTPATIENT)
Dept: VASCULAR SURGERY | Age: 59
End: 2019-07-19
Payer: COMMERCIAL

## 2019-07-19 ENCOUNTER — HOSPITAL ENCOUNTER (OUTPATIENT)
Dept: VASCULAR LAB | Age: 59
Discharge: HOME | End: 2019-07-19
Payer: COMMERCIAL

## 2019-07-19 VITALS
BODY MASS INDEX: 31.39 KG/M2 | DIASTOLIC BLOOD PRESSURE: 72 MMHG | SYSTOLIC BLOOD PRESSURE: 134 MMHG | HEIGHT: 67 IN | WEIGHT: 200 LBS

## 2019-07-19 DIAGNOSIS — I65.23 CAROTID ATHEROSCLEROSIS, BILATERAL: Primary | ICD-10-CM

## 2019-07-19 DIAGNOSIS — I65.23 BILATERAL CAROTID ARTERY STENOSIS: ICD-10-CM

## 2019-07-19 PROCEDURE — 93880 EXTRACRANIAL BILAT STUDY: CPT

## 2019-07-19 PROCEDURE — 99213 OFFICE O/P EST LOW 20 MIN: CPT | Performed by: SURGERY

## 2019-07-19 NOTE — PROGRESS NOTES
thyroid: not enlarged, symmetric, no tenderness/mass/nodules  Lungs: clear to auscultation bilaterally  Heart: regular rate and rhythm  Abdomen: soft, non-tender. Bowel sounds normal. No masses,  no organomegaly  Extremities: extremities normal, atraumatic, no cyanosis or edema    Pulses:   L brachial 2 R brachial 2   L radial 2 R radial 2   L femoral 1 R femoral 1   L popliteal 0 R popliteal 0   L posterior tibial 0 R posterior tibial 0   L dorsalis pedis 0 R dorsalis pedis 0   Doppler Signals:  +    Neurologic: Grossly normal    MEDICAL DECISION MAKING/TESTING  I have reviewed the testing personally and my interpretation is below. Right   The right internal carotid artery appears to have a 50-69% diameter reducing   stenosis based on velocity criteria. The right vertebral artery demonstrates normal antegrade flow. The right subclavian artery is visualized and demonstrates multiphasic flow. Left   The left common and internal carotid artery demonstrate chronic occlusion. The left external carotid artery Is chronic occluded proximal with retrograde   flow noted in the ECA feeding the superior thyroid. The left vertebral artery demonstrates abnormal hesitant flow. The left subclavian artery is visualized and demonstrates turbulent flow,   suggesting a more proximal stenosis. Assessment:     Patient Active Problem List   Diagnosis    CAD (coronary artery disease)    HTN (hypertension)    PVD (peripheral vascular disease) ([address]    Nonspecific granulomatous    Anxiety    HLD (hyperlipidemia)    Coronary stent    Carotid artery disease ([address]    Erectile dysfunction    Smoker    Subclavian vein stenosis, left       Plan:  1. Carotid atherosclerosis, bilateral  82-year-old male with carotid atherosclerosis. He's had progression of his right sided disease. I suspect this is related to his increased and continued smoking. I discussed the findings with him today.   Recommend repeat

## 2019-08-10 DIAGNOSIS — E11.9 TYPE 2 DIABETES MELLITUS WITHOUT COMPLICATION, WITHOUT LONG-TERM CURRENT USE OF INSULIN (HCC): ICD-10-CM

## 2019-08-10 DIAGNOSIS — I25.10 CORONARY ARTERY DISEASE WITHOUT ANGINA PECTORIS, UNSPECIFIED VESSEL OR LESION TYPE, UNSPECIFIED WHETHER NATIVE OR TRANSPLANTED HEART: ICD-10-CM

## 2019-08-12 RX ORDER — ASPIRIN 81 MG/1
TABLET ORAL
Qty: 90 TABLET | Refills: 2 | Status: SHIPPED | OUTPATIENT
Start: 2019-08-12 | End: 2020-07-29 | Stop reason: SDUPTHER

## 2019-09-18 DIAGNOSIS — F41.9 ANXIETY: ICD-10-CM

## 2019-09-18 RX ORDER — ESCITALOPRAM OXALATE 10 MG/1
TABLET ORAL
Qty: 90 TABLET | Refills: 3 | OUTPATIENT
Start: 2019-09-18

## 2019-09-20 DIAGNOSIS — F41.9 ANXIETY: ICD-10-CM

## 2019-09-20 RX ORDER — ESCITALOPRAM OXALATE 10 MG/1
TABLET ORAL
Qty: 90 TABLET | Refills: 3 | Status: SHIPPED | OUTPATIENT
Start: 2019-09-20 | End: 2020-01-09 | Stop reason: SDUPTHER

## 2019-12-17 DIAGNOSIS — E11.9 TYPE 2 DIABETES MELLITUS WITHOUT COMPLICATION, WITHOUT LONG-TERM CURRENT USE OF INSULIN (HCC): ICD-10-CM

## 2019-12-17 DIAGNOSIS — E78.2 MIXED HYPERLIPIDEMIA: ICD-10-CM

## 2019-12-17 DIAGNOSIS — I25.10 CORONARY ARTERY DISEASE WITHOUT ANGINA PECTORIS, UNSPECIFIED VESSEL OR LESION TYPE, UNSPECIFIED WHETHER NATIVE OR TRANSPLANTED HEART: ICD-10-CM

## 2019-12-17 RX ORDER — EZETIMIBE 10 MG/1
TABLET ORAL
Qty: 90 TABLET | Refills: 3 | OUTPATIENT
Start: 2019-12-17

## 2020-01-09 ENCOUNTER — OFFICE VISIT (OUTPATIENT)
Dept: PRIMARY CARE CLINIC | Age: 60
End: 2020-01-09
Payer: COMMERCIAL

## 2020-01-09 VITALS
WEIGHT: 192 LBS | SYSTOLIC BLOOD PRESSURE: 120 MMHG | HEIGHT: 67 IN | HEART RATE: 62 BPM | BODY MASS INDEX: 30.13 KG/M2 | DIASTOLIC BLOOD PRESSURE: 70 MMHG

## 2020-01-09 DIAGNOSIS — Z00.00 PHYSICAL EXAM: ICD-10-CM

## 2020-01-09 LAB
A/G RATIO: 1.7 (ref 1.1–2.2)
ALBUMIN SERPL-MCNC: 4.5 G/DL (ref 3.4–5)
ALP BLD-CCNC: 69 U/L (ref 40–129)
ALT SERPL-CCNC: 13 U/L (ref 10–40)
ANION GAP SERPL CALCULATED.3IONS-SCNC: 17 MMOL/L (ref 3–16)
AST SERPL-CCNC: 13 U/L (ref 15–37)
BASOPHILS ABSOLUTE: 0.1 K/UL (ref 0–0.2)
BASOPHILS RELATIVE PERCENT: 1.2 %
BILIRUB SERPL-MCNC: 0.4 MG/DL (ref 0–1)
BUN BLDV-MCNC: 17 MG/DL (ref 7–20)
CALCIUM SERPL-MCNC: 9.6 MG/DL (ref 8.3–10.6)
CHLORIDE BLD-SCNC: 99 MMOL/L (ref 99–110)
CHOLESTEROL, TOTAL: 145 MG/DL (ref 0–199)
CO2: 22 MMOL/L (ref 21–32)
CREAT SERPL-MCNC: 1 MG/DL (ref 0.9–1.3)
EOSINOPHILS ABSOLUTE: 0.3 K/UL (ref 0–0.6)
EOSINOPHILS RELATIVE PERCENT: 4.1 %
GFR AFRICAN AMERICAN: >60
GFR NON-AFRICAN AMERICAN: >60
GLOBULIN: 2.7 G/DL
GLUCOSE BLD-MCNC: 199 MG/DL (ref 70–99)
HBA1C MFR BLD: 9.3 %
HCT VFR BLD CALC: 45.2 % (ref 40.5–52.5)
HDLC SERPL-MCNC: 35 MG/DL (ref 40–60)
HEMOGLOBIN: 15.4 G/DL (ref 13.5–17.5)
LDL CHOLESTEROL CALCULATED: 70 MG/DL
LYMPHOCYTES ABSOLUTE: 2.4 K/UL (ref 1–5.1)
LYMPHOCYTES RELATIVE PERCENT: 28.3 %
MCH RBC QN AUTO: 31.4 PG (ref 26–34)
MCHC RBC AUTO-ENTMCNC: 34 G/DL (ref 31–36)
MCV RBC AUTO: 92.5 FL (ref 80–100)
MONOCYTES ABSOLUTE: 0.7 K/UL (ref 0–1.3)
MONOCYTES RELATIVE PERCENT: 8 %
NEUTROPHILS ABSOLUTE: 5 K/UL (ref 1.7–7.7)
NEUTROPHILS RELATIVE PERCENT: 58.4 %
PDW BLD-RTO: 13.9 % (ref 12.4–15.4)
PLATELET # BLD: 249 K/UL (ref 135–450)
PMV BLD AUTO: 7.7 FL (ref 5–10.5)
POTASSIUM SERPL-SCNC: 4.8 MMOL/L (ref 3.5–5.1)
RBC # BLD: 4.89 M/UL (ref 4.2–5.9)
SODIUM BLD-SCNC: 138 MMOL/L (ref 136–145)
TOTAL PROTEIN: 7.2 G/DL (ref 6.4–8.2)
TRIGL SERPL-MCNC: 199 MG/DL (ref 0–150)
TSH SERPL DL<=0.05 MIU/L-ACNC: 1.45 UIU/ML (ref 0.27–4.2)
VITAMIN D 25-HYDROXY: 18.6 NG/ML
VLDLC SERPL CALC-MCNC: 40 MG/DL
WBC # BLD: 8.5 K/UL (ref 4–11)

## 2020-01-09 PROCEDURE — 83036 HEMOGLOBIN GLYCOSYLATED A1C: CPT | Performed by: INTERNAL MEDICINE

## 2020-01-09 PROCEDURE — 99396 PREV VISIT EST AGE 40-64: CPT | Performed by: INTERNAL MEDICINE

## 2020-01-09 RX ORDER — EZETIMIBE 10 MG/1
10 TABLET ORAL DAILY
Qty: 90 TABLET | Refills: 3 | Status: SHIPPED | OUTPATIENT
Start: 2020-01-09 | End: 2020-07-29 | Stop reason: SDUPTHER

## 2020-01-09 RX ORDER — ROSUVASTATIN CALCIUM 40 MG/1
40 TABLET, COATED ORAL DAILY
Qty: 90 TABLET | Refills: 3 | Status: SHIPPED | OUTPATIENT
Start: 2020-01-09 | End: 2020-07-29 | Stop reason: SDUPTHER

## 2020-01-09 RX ORDER — ESCITALOPRAM OXALATE 10 MG/1
10 TABLET ORAL DAILY
Qty: 90 TABLET | Refills: 5 | Status: SHIPPED | OUTPATIENT
Start: 2020-01-09 | End: 2021-01-26

## 2020-01-09 RX ORDER — METFORMIN HYDROCHLORIDE 500 MG/1
1000 TABLET, EXTENDED RELEASE ORAL 2 TIMES DAILY
Qty: 180 TABLET | Refills: 5 | Status: SHIPPED | OUTPATIENT
Start: 2020-01-09 | End: 2020-07-29 | Stop reason: SDUPTHER

## 2020-01-09 RX ORDER — ESCITALOPRAM OXALATE 20 MG/1
20 TABLET ORAL DAILY
Qty: 90 TABLET | Refills: 5 | Status: ON HOLD | OUTPATIENT
Start: 2020-01-09 | End: 2020-07-21 | Stop reason: ALTCHOICE

## 2020-01-09 RX ORDER — ATENOLOL 50 MG/1
50 TABLET ORAL DAILY
Qty: 90 TABLET | Refills: 3 | Status: SHIPPED | OUTPATIENT
Start: 2020-01-09 | End: 2020-07-29 | Stop reason: SDUPTHER

## 2020-01-09 ASSESSMENT — ENCOUNTER SYMPTOMS
COUGH: 0
WHEEZING: 0
CONSTIPATION: 0
GASTROINTESTINAL NEGATIVE: 1
SHORTNESS OF BREATH: 0
DIARRHEA: 0
EYES NEGATIVE: 1
CHEST TIGHTNESS: 0
BLOOD IN STOOL: 0

## 2020-01-09 ASSESSMENT — PATIENT HEALTH QUESTIONNAIRE - PHQ9
1. LITTLE INTEREST OR PLEASURE IN DOING THINGS: 0
SUM OF ALL RESPONSES TO PHQ QUESTIONS 1-9: 0
SUM OF ALL RESPONSES TO PHQ QUESTIONS 1-9: 0
2. FEELING DOWN, DEPRESSED OR HOPELESS: 0
SUM OF ALL RESPONSES TO PHQ9 QUESTIONS 1 & 2: 0

## 2020-01-10 RX ORDER — METFORMIN HYDROCHLORIDE 500 MG/1
TABLET, EXTENDED RELEASE ORAL
Qty: 180 TABLET | Refills: 3 | OUTPATIENT
Start: 2020-01-10

## 2020-02-07 ENCOUNTER — HOSPITAL ENCOUNTER (OUTPATIENT)
Dept: VASCULAR LAB | Age: 60
Discharge: HOME | End: 2020-02-07
Payer: COMMERCIAL

## 2020-02-07 PROCEDURE — 93880 EXTRACRANIAL BILAT STUDY: CPT

## 2020-02-18 ENCOUNTER — TELEPHONE (OUTPATIENT)
Dept: VASCULAR SURGERY | Age: 60
End: 2020-02-18

## 2020-03-06 RX ORDER — CLOPIDOGREL BISULFATE 75 MG/1
TABLET ORAL
Qty: 90 TABLET | Refills: 2 | Status: SHIPPED | OUTPATIENT
Start: 2020-03-06 | End: 2020-07-29 | Stop reason: SDUPTHER

## 2020-07-20 ENCOUNTER — TELEPHONE (OUTPATIENT)
Dept: CARDIOLOGY CLINIC | Age: 60
End: 2020-07-20

## 2020-07-20 ENCOUNTER — APPOINTMENT (OUTPATIENT)
Dept: CARDIAC CATH/INVASIVE PROCEDURES | Age: 60
DRG: 246 | End: 2020-07-20
Payer: COMMERCIAL

## 2020-07-20 ENCOUNTER — HOSPITAL ENCOUNTER (INPATIENT)
Age: 60
LOS: 1 days | Discharge: HOME | DRG: 246 | End: 2020-07-21
Attending: STUDENT IN AN ORGANIZED HEALTH CARE EDUCATION/TRAINING PROGRAM | Admitting: INTERNAL MEDICINE
Payer: COMMERCIAL

## 2020-07-20 PROBLEM — I21.4 NSTEMI (NON-ST ELEVATED MYOCARDIAL INFARCTION) (HCC): Status: ACTIVE | Noted: 2020-07-20

## 2020-07-20 PROBLEM — Z98.61 CAD S/P PERCUTANEOUS CORONARY ANGIOPLASTY: Status: ACTIVE | Noted: 2020-07-20

## 2020-07-20 PROBLEM — R07.9 CARDIAC CHEST PAIN: Status: ACTIVE | Noted: 2020-07-20

## 2020-07-20 PROBLEM — I25.10 CAD S/P PERCUTANEOUS CORONARY ANGIOPLASTY: Status: ACTIVE | Noted: 2020-07-20

## 2020-07-20 LAB
ANION GAP SERPL CALCULATED.3IONS-SCNC: 13 MMOL/L (ref 3–16)
BUN BLDV-MCNC: 15 MG/DL (ref 7–20)
CALCIUM SERPL-MCNC: 8.7 MG/DL (ref 8.3–10.6)
CHLORIDE BLD-SCNC: 102 MMOL/L (ref 99–110)
CO2: 21 MMOL/L (ref 21–32)
CREAT SERPL-MCNC: 0.9 MG/DL (ref 0.9–1.3)
EKG ATRIAL RATE: 60 BPM
EKG DIAGNOSIS: NORMAL
EKG P AXIS: 4 DEGREES
EKG P-R INTERVAL: 134 MS
EKG Q-T INTERVAL: 466 MS
EKG QRS DURATION: 152 MS
EKG QTC CALCULATION (BAZETT): 466 MS
EKG R AXIS: -23 DEGREES
EKG T AXIS: 37 DEGREES
EKG VENTRICULAR RATE: 60 BPM
GFR AFRICAN AMERICAN: >60
GFR NON-AFRICAN AMERICAN: >60
GLUCOSE BLD-MCNC: 136 MG/DL (ref 70–99)
HCT VFR BLD CALC: 41.6 % (ref 40.5–52.5)
HEMOGLOBIN: 14 G/DL (ref 13.5–17.5)
LEFT VENTRICULAR EJECTION FRACTION MODE: NORMAL
LV EF: 55 %
MCH RBC QN AUTO: 31.3 PG (ref 26–34)
MCHC RBC AUTO-ENTMCNC: 33.7 G/DL (ref 31–36)
MCV RBC AUTO: 92.7 FL (ref 80–100)
PDW BLD-RTO: 14.4 % (ref 12.4–15.4)
PLATELET # BLD: 211 K/UL (ref 135–450)
PMV BLD AUTO: 7.6 FL (ref 5–10.5)
POC ACT LR: 194 SEC
POC ACT LR: 234 SEC
POTASSIUM SERPL-SCNC: 4 MMOL/L (ref 3.5–5.1)
RBC # BLD: 4.49 M/UL (ref 4.2–5.9)
SODIUM BLD-SCNC: 136 MMOL/L (ref 136–145)
WBC # BLD: 7.2 K/UL (ref 4–11)

## 2020-07-20 PROCEDURE — 85347 COAGULATION TIME ACTIVATED: CPT

## 2020-07-20 PROCEDURE — 4A023N7 MEASUREMENT OF CARDIAC SAMPLING AND PRESSURE, LEFT HEART, PERCUTANEOUS APPROACH: ICD-10-PCS | Performed by: INTERNAL MEDICINE

## 2020-07-20 PROCEDURE — 2500000003 HC RX 250 WO HCPCS

## 2020-07-20 PROCEDURE — 92941 PRQ TRLML REVSC TOT OCCL AMI: CPT | Performed by: INTERNAL MEDICINE

## 2020-07-20 PROCEDURE — 2580000003 HC RX 258: Performed by: INTERNAL MEDICINE

## 2020-07-20 PROCEDURE — 2709999900 HC NON-CHARGEABLE SUPPLY

## 2020-07-20 PROCEDURE — 99152 MOD SED SAME PHYS/QHP 5/>YRS: CPT

## 2020-07-20 PROCEDURE — C1874 STENT, COATED/COV W/DEL SYS: HCPCS

## 2020-07-20 PROCEDURE — 99284 EMERGENCY DEPT VISIT MOD MDM: CPT

## 2020-07-20 PROCEDURE — B2111ZZ FLUOROSCOPY OF MULTIPLE CORONARY ARTERIES USING LOW OSMOLAR CONTRAST: ICD-10-PCS | Performed by: INTERNAL MEDICINE

## 2020-07-20 PROCEDURE — C1725 CATH, TRANSLUMIN NON-LASER: HCPCS

## 2020-07-20 PROCEDURE — C1894 INTRO/SHEATH, NON-LASER: HCPCS

## 2020-07-20 PROCEDURE — 93458 L HRT ARTERY/VENTRICLE ANGIO: CPT | Performed by: INTERNAL MEDICINE

## 2020-07-20 PROCEDURE — C1769 GUIDE WIRE: HCPCS

## 2020-07-20 PROCEDURE — 6360000002 HC RX W HCPCS

## 2020-07-20 PROCEDURE — 6360000004 HC RX CONTRAST MEDICATION: Performed by: INTERNAL MEDICINE

## 2020-07-20 PROCEDURE — 92928 PRQ TCAT PLMT NTRAC ST 1 LES: CPT

## 2020-07-20 PROCEDURE — 2580000003 HC RX 258

## 2020-07-20 PROCEDURE — C1887 CATHETER, GUIDING: HCPCS

## 2020-07-20 PROCEDURE — 85027 COMPLETE CBC AUTOMATED: CPT

## 2020-07-20 PROCEDURE — 80048 BASIC METABOLIC PNL TOTAL CA: CPT

## 2020-07-20 PROCEDURE — 6370000000 HC RX 637 (ALT 250 FOR IP)

## 2020-07-20 PROCEDURE — 99153 MOD SED SAME PHYS/QHP EA: CPT

## 2020-07-20 PROCEDURE — 2060000000 HC ICU INTERMEDIATE R&B

## 2020-07-20 PROCEDURE — 027034Z DILATION OF CORONARY ARTERY, ONE ARTERY WITH DRUG-ELUTING INTRALUMINAL DEVICE, PERCUTANEOUS APPROACH: ICD-10-PCS | Performed by: INTERNAL MEDICINE

## 2020-07-20 PROCEDURE — 93005 ELECTROCARDIOGRAM TRACING: CPT | Performed by: INTERNAL MEDICINE

## 2020-07-20 PROCEDURE — B2151ZZ FLUOROSCOPY OF LEFT HEART USING LOW OSMOLAR CONTRAST: ICD-10-PCS | Performed by: INTERNAL MEDICINE

## 2020-07-20 PROCEDURE — 99223 1ST HOSP IP/OBS HIGH 75: CPT | Performed by: INTERNAL MEDICINE

## 2020-07-20 PROCEDURE — 93458 L HRT ARTERY/VENTRICLE ANGIO: CPT

## 2020-07-20 PROCEDURE — 93010 ELECTROCARDIOGRAM REPORT: CPT | Performed by: INTERNAL MEDICINE

## 2020-07-20 RX ORDER — SODIUM CHLORIDE 0.9 % (FLUSH) 0.9 %
10 SYRINGE (ML) INJECTION EVERY 12 HOURS SCHEDULED
Status: DISCONTINUED | OUTPATIENT
Start: 2020-07-20 | End: 2020-07-21 | Stop reason: HOSPADM

## 2020-07-20 RX ORDER — ONDANSETRON 2 MG/ML
4 INJECTION INTRAMUSCULAR; INTRAVENOUS EVERY 6 HOURS PRN
Status: DISCONTINUED | OUTPATIENT
Start: 2020-07-20 | End: 2020-07-21 | Stop reason: HOSPADM

## 2020-07-20 RX ORDER — SODIUM CHLORIDE 0.9 % (FLUSH) 0.9 %
10 SYRINGE (ML) INJECTION EVERY 12 HOURS SCHEDULED
Status: DISCONTINUED | OUTPATIENT
Start: 2020-07-20 | End: 2020-07-20 | Stop reason: SDUPTHER

## 2020-07-20 RX ORDER — ACETAMINOPHEN 325 MG/1
650 TABLET ORAL EVERY 4 HOURS PRN
Status: DISCONTINUED | OUTPATIENT
Start: 2020-07-20 | End: 2020-07-21 | Stop reason: HOSPADM

## 2020-07-20 RX ORDER — SODIUM CHLORIDE 0.9 % (FLUSH) 0.9 %
10 SYRINGE (ML) INJECTION PRN
Status: DISCONTINUED | OUTPATIENT
Start: 2020-07-20 | End: 2020-07-21 | Stop reason: HOSPADM

## 2020-07-20 RX ORDER — HYDRALAZINE HYDROCHLORIDE 20 MG/ML
10 INJECTION INTRAMUSCULAR; INTRAVENOUS
Status: DISCONTINUED | OUTPATIENT
Start: 2020-07-20 | End: 2020-07-21 | Stop reason: HOSPADM

## 2020-07-20 RX ORDER — 0.9 % SODIUM CHLORIDE 0.9 %
250 INTRAVENOUS SOLUTION INTRAVENOUS PRN
Status: DISCONTINUED | OUTPATIENT
Start: 2020-07-20 | End: 2020-07-21

## 2020-07-20 RX ORDER — SODIUM CHLORIDE 9 MG/ML
INJECTION, SOLUTION INTRAVENOUS CONTINUOUS
Status: ACTIVE | OUTPATIENT
Start: 2020-07-20 | End: 2020-07-20

## 2020-07-20 RX ORDER — MORPHINE SULFATE 2 MG/ML
2 INJECTION, SOLUTION INTRAMUSCULAR; INTRAVENOUS
Status: ACTIVE | OUTPATIENT
Start: 2020-07-20 | End: 2020-07-20

## 2020-07-20 RX ORDER — ATROPINE SULFATE 0.4 MG/ML
0.5 AMPUL (ML) INJECTION
Status: DISCONTINUED | OUTPATIENT
Start: 2020-07-20 | End: 2020-07-20 | Stop reason: ALTCHOICE

## 2020-07-20 RX ORDER — SODIUM CHLORIDE 0.9 % (FLUSH) 0.9 %
10 SYRINGE (ML) INJECTION PRN
Status: DISCONTINUED | OUTPATIENT
Start: 2020-07-20 | End: 2020-07-20 | Stop reason: SDUPTHER

## 2020-07-20 RX ORDER — SODIUM CHLORIDE 9 MG/ML
INJECTION, SOLUTION INTRAVENOUS CONTINUOUS
Status: DISCONTINUED | OUTPATIENT
Start: 2020-07-20 | End: 2020-07-20

## 2020-07-20 RX ADMIN — IOPAMIDOL 190 ML: 755 INJECTION, SOLUTION INTRAVENOUS at 14:37

## 2020-07-20 RX ADMIN — SODIUM CHLORIDE: 9 INJECTION, SOLUTION INTRAVENOUS at 15:28

## 2020-07-20 RX ADMIN — SODIUM CHLORIDE, PRESERVATIVE FREE 10 ML: 5 INJECTION INTRAVENOUS at 19:52

## 2020-07-20 ASSESSMENT — PAIN SCALES - GENERAL
PAINLEVEL_OUTOF10: 0

## 2020-07-20 NOTE — FLOWSHEET NOTE
Patient transferred to [facility]. RN to RN handoff done at bedside. Site checked and remains soft and unremarkable. Pulses unchanged. Site management education and tip sheet reviewed/provided with receiving RN. Receiving RN verbalized understanding.      Receiving RN: Sergio Gann  Cath Lab RN: Shea Ferrer

## 2020-07-20 NOTE — ED PROVIDER NOTES
tobacco: Current User   Substance and Sexual Activity    Alcohol use: Yes     Comment: occassional    Drug use: No    Sexual activity: Yes     Partners: Female   Lifestyle    Physical activity     Days per week: Not on file     Minutes per session: Not on file    Stress: Not on file   Relationships    Social connections     Talks on phone: Not on file     Gets together: Not on file     Attends [employer] service: Not on file     Active member of club or organization: Not on file     Attends meetings of clubs or organizations: Not on file     Relationship status: Not on file    Intimate partner violence     Fear of current or ex partner: Not on file     Emotionally abused: Not on file     Physically abused: Not on file     Forced sexual activity: Not on file   Other Topics Concern    Not on file   Social History Narrative    Not on file        Review of Systems   10 total systems reviewed and found to be negative unless otherwise noted in HPI     Physical Exam   /71   Pulse 61   Temp 98.2 °F (36.8 °C) (Oral)   Resp 16   Ht 5' 7\" (1.702 m)   Wt 180 lb 8.9 oz (81.9 kg)   SpO2 96%   BMI 28.28 kg/m²      CONSTITUTIONAL: Anxious  HEAD: atraumatic, normocephalic   EYES: PERRL, No injection, discharge or scleral icterus. ENT: Moist mucous membranes. NECK: Normal ROM, NO LAD   CARDIOVASCULAR: Regular rate and rhythm. No murmurs or gallop. PULMONARY/CHEST: Airway patent. No retractions. Breath sounds clear with good air entry bilaterally. ABDOMEN: Soft, Non-distended and non-tender, without guarding or rebound. SKIN: Acyanotic, warm, dry   MUSCULOSKELETAL: No swelling, tenderness or deformity   NEUROLOGICAL: Awake and oriented x 3. Pulses intact. Grossly nonfocal   Nursing note and vitals reviewed.      ED Course & Medical Decision Making   Medications   0.9 % sodium chloride infusion ( Intravenous Stopped 7/20/20 1951)   morphine (PF) injection 2 mg (has no administration in time range) iopamidol (ISOVUE-370) 76 % injection 190 mL (190 mLs Other Given by Other 7/20/20 1433)      Labs Reviewed   BASIC METABOLIC PANEL - Abnormal; Notable for the following components:       Result Value    Glucose 136 (*)     All other components within normal limits    Narrative:     Performed at:  [facility]  [address] [facility] [address]   Phone [phone]   BASIC METABOLIC PANEL - Abnormal; Notable for the following components:    Glucose 134 (*)     All other components within normal limits    Narrative:     Performed at:  [facility]  [address] [facility] [address]   Phone [phone]   TROPONIN - Abnormal; Notable for the following components:    Troponin 0.15 (*)     All other components within normal limits    Narrative:     Performed at:  [facility]  [address] [facility] [address] [address]   Phone [phone]   LIPID PANEL - Abnormal; Notable for the following components:    Triglycerides 214 (*)     HDL 33 (*)     All other components within normal limits    Narrative:     Performed at:  [facility]  [address] [facility] [address] [address]   Phone [phone]   CBC    Narrative:     Performed at:  [facility]  [address] [place] [address]   Phone [phone]   PLATELET COUNT    Narrative:     Performed at:  [facility]  [address] [place] [address]   Phone [phone]   HEMOGLOBIN A1C    Narrative:     Performed at:  [facility]  [address] [place] [address]   Phone [phone]   POC ACT-LR    Narrative:     Performed at:  [facility]  [address] [place], [place] [address] [address]   Phone [phone]   POC ACT-LR    Narrative:     Performed at:  [employer] providers:yes  Independent visualization of images, tracings, or specimens:yes  Risk of Complications, Morbidity, and/or Mortality:Moderate  Presenting problems: moderate Diagnostic procedures: moderate yes Management options: moderate     Critical Care Attestation   Total critical care time: 35 minutes minimum   Critical care time does not include separately billable procedures and treating other patients. Critical care was necessary to treat or prevent imminent or life-threatening deterioration of the following conditions: Acute coronary syndrome taken straight to the Cath Lab from the ED. case discussed with consultants.       _________________________________________________________________________________________  This record is transcribed utilizing voice recognition technology. There are inherent limitations in this technology. In addition, there may be limitations in editing of this report. If there are any discrepancies, please contact me directly.         Michael Santamaria MD  07/24/20 9178

## 2020-07-20 NOTE — PROGRESS NOTES
4 Eyes Skin Assessment     The patient is being assess for  Admission    I agree that 2 RN's have performed a thorough Head to Toe Skin Assessment on the patient. ALL assessment sites listed below have been assessed. Areas assessed by both nurses:   [x]   Head, Face, and Ears   [x]   Shoulders, Back, and Chest  [x]   Arms, Elbows, and Hands   [x]   Coccyx, Sacrum, and IschIum  [x]   Legs, Feet, and Heels        Does the Patient have Skin Breakdown?   No         Juan Prevention initiated:  No   Wound Care Orders initiated:  No      [facility] nurse consulted for Pressure Injury (Stage 3,4, Unstageable, DTI, NWPT, and Complex wounds), New and Established Ostomies:  No      Nurse 1 eSignature: Electronically signed by Ivan Harada, RN on 7/20/20 at 4:42 PM EDT    **SHARE this note so that the co-signing nurse is able to place an eSignature**    Nurse 2 eSignature: Electronically signed by Any Doherty RN on 7/20/20 at 4:43 PM EDT

## 2020-07-20 NOTE — PROCEDURES
[address] [place] [address]                            CARDIAC CATHETERIZATION    PATIENT NAME: Rhiannon Dixon                      :        1960  MED REC NO:   [id number]                          ROOM:       5104  ACCOUNT NO:   [de-identified]                           ADMIT DATE: 2020  PROVIDER:     Soila Armenta MD    DATE OF PROCEDURE:  2020    INDICATION FOR PROCEDURE:  Non-ST-elevation myocardial infarction. PROCEDURE:  The risks and benefits of the procedure were explained to  the patient and informed consent was obtained. An [facility] test was  performed on the patient's right wrist to ensure an intact palmar arch. He was placed on the cardiac catheterization table and prepped and  draped in sterile fashion. Anesthesia was provided in the volar surface  of the right wrist with 1% lidocaine injected subcutaneously. Using a micropuncture kit, the right radial artery was accessed and  cannulated and a 5-Luxembourgish sheath inserted using Seldinger technique. Pre-cocktail of heparin, verapamil and nitroglycerin was given through  the sheath port. Over the 0.035 J-wire, the JL3.5, 5-Luxembourgish diagnostic catheter was  advanced to the ostium of the left main coronary artery. Coronary  angiography was performed to this system. Catheter was removed over the  wire. Next, the JR5, 5-Luxembourgish diagnostic catheter was advanced to the  ostium of the right coronary artery and coronary angiography was  performed to this system. The catheter was removed over the wire. Lastly, the pigtail catheter was advanced over the wire into the left  ventricle. Left ventricular end-diastolic pressure measurements were  obtained and then a power injection left ventriculogram was performed. Catheter was flushed and placed back on pressure and then pulled back  across the aortic valve to assess for gradient.     After review of the films, decision was made to intervene upon the 99%  lesion in the stent in the mid LAD. The 5-Australian radial sheath was exchanged for a 6-Australian radial sheath. Over the 0.035 J-wire, the EBU 3.5, 6-Australian guide catheter was advanced  to the ostium of the left main coronary artery. The LAD was wired with  a Prowater 0.014 coronary wire. The lesion was then predilated with  first a 3-mm compliant balloon and then a 3 mm x 20-mm noncompliant  balloon. We then placed a 3 mm x 22-mm Colt drug-eluting stent. This  was placed across the lesion and deployed to 3.2 mm in diameter. We  then ballooned inside the stent with a 3.25 x 20-mm noncompliant balloon  in serial inflations up to 3.34 mm in diameter. Stent balloon was  removed and intraarterial nitroglycerin was given. This demonstrated  JACKI 3 flow in the vessel with 0% residual stenosis. The wire was  removed and final angiography was performed. The guide catheter was  removed over the wire. The post cocktail of verapamil and nitroglycerin  was given through the sheath port. The patient was given an additional  loading dose of clopidogrel 600 mg once after the case. Procedure was  performed on IV unfractionated heparin and ACTs were checked throughout  the case to maintain an ACT greater than 250 seconds. Moderate sedation was performed for the procedure. The patient had an  ASA grade of II and a Mallampati score of II. Total duration of  sedation was 60 minutes. He received 4 mg of IV Versed and 100 mcg of  fentanyl for the procedure. Vital signs were monitored throughout and  remained stable. Estimated blood loss less than 20 mL. FINDINGS:  1. Right-dominant coronary arterial circulation with 100% occlusion of  the proximal right coronary artery. There are collaterals from the  right to the distal right and also from the left. In the left system,  there is no left main disease.   There is a 60% midcircumflex lesion and  the first obtuse marginal branch is occluded and what appears to be  in-stent. The left anterior descending artery had a 99% lesion in-stent  in the midportion. This was successfully intervened upon with a 3 mm x  22-mm Waterproof drug-eluting stent post dilated to 3.34 mm in diameter. The  first diagonal branch appears to be occluded in-stent as well. 2.  Normal left ventricular systolic function. LV ejection fraction of  55%. 3.  Normal left ventricular end-diastolic pressure. 4.  No gradient across the aortic valve on pullback to suggest aortic  stenosis.         Jess Isbell MD    D: 07/20/2020 14:51:36       T: 07/20/2020 14:57:46     [email or web address]  Job#: [id number]     Doc#: [id number]    CC:

## 2020-07-20 NOTE — PROGRESS NOTES
Patient admitted to room 5104. A/O x4. Telemetry monitor placed. VSS. TR band to the right wrist with 10 cc of air in. Oriented to the room and call light.

## 2020-07-20 NOTE — TELEPHONE ENCOUNTER
Wife called to report [facility], Dr. Rachael Kraft to call. Will close encounter.

## 2020-07-20 NOTE — ANESTHESIA PRE-OP
Brief Pre-Op Note/Sedation Assessment      [facility]  1960  CATH/NONE      [id number]  1:39 PM    Planned Procedure: Cardiac Catheterization Procedure    Post Procedure Plan: Return to same level of care    Consent: I have discussed with the patient and/or the patient representative the indication, alternatives, and the possible risks and/or complications of the planned procedure and the anesthesia methods. The patient and/or patient representative appear to understand and agree to proceed.     Chief Complaint: NSTEMI      Indications for Cath Procedure:  ACS > 24 hrs  Anginal Classification within 2 weeks:  CCS III - Symptoms with everyday living activities, i.e., moderate limitation  NYHA Heart Failure Class within 2 weeks: No symptoms  Is Cath Lab Visit Valve-related?: No  Surgical Risk: N/A  Functional Type: N/A    Anti- Anginal Meds within 2 weeks:   Yes: Beta Blockers, Aspirin and Statin (Any)    Stress or Imaging Studies Performed:  None     Vital Signs:  BP (!) 157/88   Pulse 61   Temp 98.5 °F (36.9 °C) (Oral)   Resp 23   Ht 5' 7\" (1.702 m)   Wt 183 lb 10.3 oz (83.3 kg)   SpO2 100%   BMI 28.76 kg/m²     Allergies:  No Known Allergies    Past Medical History:  Past Medical History:   Diagnosis Date    Anxiety     CAD (coronary artery disease)     Hyperlipidemia     Hypertension     PVD (peripheral vascular disease) ([facility])          Surgical History:  Past Surgical History:   Procedure Laterality Date    CORONARY ANGIOPLASTY           Medications:  Current Facility-Administered Medications   Medication Dose Route Frequency Provider Last Rate Last Dose    0.9 % sodium chloride infusion   Intravenous Continuous Donna Zapata MD        sodium chloride flush 0.9 % injection 10 mL  10 mL Intravenous 2 times per day Donna Zapata MD        sodium chloride flush 0.9 % injection 10 mL  10 mL Intravenous PRN Donna Zapata MD Pre-Sedation:    Pre-Sedation Documentation and Exam:  I have personally completed a history, physical exam & review of systems for this patient (see notes). Prior History of Anesthesia Complications:   none    Modified Mallampati:  II (soft palate, uvula, fauces visible)    ASA Classification:  Class 3 - A patient with severe systemic disease that limits activity but is not incapacitating      Gregorio Scale: Activity:  2 - Able to move 4 extremities voluntarily on command  Respiration:  2 - Able to breathe deeply and cough freely  Circulation:  2 - BP+/- 20mmHg of normal  Consciousness:  2 - Fully awake  Oxygen Saturation (color):  2 - Able to maintain oxygen saturation >92% on room air    Sedation/Anesthesia Plan:  Guard the patient's safety and welfare. Minimize physical discomfort and pain. Minimize negative psychological responses to treatment by providing sedation and analgesia and maximize the potential amnesia. Patient to meet pre-procedure discharge plan.     Medication Planned:  midazolam intravenously and fentanyl intravenously    Patient is an appropriate candidate for plan of sedation: yes      Electronically signed by Peyton Tejeda MD on 7/20/2020 at 1:39 PM

## 2020-07-20 NOTE — TELEPHONE ENCOUNTER
Patient was at [facility] over the weekend with NSTEMI. Wife states he was admitted on Friday afternoon and left on Saturday. He did not want to stay in the hospital for an angiogram on Monday. I discussed with Shereen Ormond, . She stated the patient would need to come to the ED at [facility] as we would not be able to get pre-cert done for the procedure. According to [facility], patient was last seen by Dr. Dion Pan in 11/2013. I called and spoke to wife and made her aware that patient would need to come to ED at [facility], she verbalized understanding. I will make Dr. Dion Pan aware.

## 2020-07-20 NOTE — ED TRIAGE NOTES
Pt states he was sent to ED by Dr. Fabián Mustafa to have angio done. Pt states he was discharged from hospital 2 days ago after dx NSTEMI. Hx of stent placement 8 years ago. Denies any chest pain at this time. No s/s or c/o pain or distress upon ED arrival.  VSS.

## 2020-07-20 NOTE — H&P
Fawn Beltre [address]  1960 July 20, 2020        CC: Chest Pain    HPI:  The patient is 64 y.o. male with a past medical history significant for SULEMA, severe PAD with infrarenal aortic occlusion and CAD with prior PCI who presented to the [facility] ED with episodes of chest pain. He had been at [facility] with a NSTEMi but left yesterday due to severe anxiety. Today. He states he has not had any further chest pain. He denies any shortness of breath. He says his symptoms are similar to what he had in the past with his coronary stents. Review of Systems:  Constitutional: No fatigue, weakness, night sweats or fever. HEENT: No new vision difficulties or ringing in the ears. Respiratory: No new SOB, PND, orthopnea or cough. Cardiovascular: See HPI   GI: No n/v, diarrhea, constipation, abdominal pain or changes in bowel habits. No melena, no hematochezia  : No urinary frequency, urgency, incontinence, hematuria or dysuria. Skin: No cyanosis or skin lesions. Musculoskeletal: No new muscle or joint pain. Neurological: No syncope or TIA-like symptoms.   Psychiatric: No anxiety, insomnia or depression     Past Medical History:   Diagnosis Date    Anxiety     CAD (coronary artery disease)     Hyperlipidemia     Hypertension     PVD (peripheral vascular disease) ([address]      Past Surgical History:   Procedure Laterality Date    CORONARY ANGIOPLASTY       Family History   Problem Relation Age of Onset    Heart Disease Father     High Blood Pressure Father     High Cholesterol Father     High Blood Pressure Mother      Social History     Tobacco Use    Smoking status: Current Every Day Smoker     Packs/day: 0.50     Years: 20.00     Pack years: 10.00     Types: Cigarettes    Smokeless tobacco: Current User   Substance Use Topics    Alcohol use: Yes     Comment: occassional    Drug use: No       No Known Allergies  Current Facility-Administered Medications   Medication Dose Route Frequency Provider Last Rate Last Dose    0.9 % sodium chloride infusion   Intravenous Continuous Donna Zapata MD        sodium chloride flush 0.9 % injection 10 mL  10 mL Intravenous 2 times per day Donna Zapata MD        sodium chloride flush 0.9 % injection 10 mL  10 mL Intravenous PRN Donna Zapata MD           Physical Exam:   BP (!) 157/88   Pulse 61   Temp 98.5 °F (36.9 °C) (Oral)   Resp 23   Ht 5' 7\" (1.702 m)   Wt 183 lb 10.3 oz (83.3 kg)   SpO2 100%   BMI 28.76 kg/m²   No intake or output data in the 24 hours ending 07/20/20 1331  Wt Readings from Last 2 Encounters:   07/20/20 183 lb 10.3 oz (83.3 kg)   01/09/20 192 lb (87.1 kg)     Constitutional: He is oriented to person, place, and time. He appears well-developed and well-nourished. In no acute distress. Head: Normocephalic and atraumatic. Neck: Neck supple. No JVD present. Carotid bruit is not present. No mass and no thyromegaly present. No lymphadenopathy present. Cardiovascular: Normal rate, regular rhythm, normal heart sounds and intact distal pulses. Exam reveals no gallop and no friction rub. No murmur heard. Pulmonary/Chest: Effort normal and breath sounds normal. No respiratory distress. He has no wheezes, rhonchi or rales. Abdominal: Soft, non-tender. Bowel sounds and aorta are normal. He exhibits no organomegaly, mass or bruit. Extremities: No edema, cyanosis, or clubbing. Pulses are 2+ radial/carotid/dorsalis pedis and posterior tibial bilaterally. Neurological: He is alert and oriented to person, place, and time. He has normal reflexes. No cranial nerve deficit. Coordination normal.   Skin: Skin is warm and dry. There is no rash or diaphoresis. Psychiatric: He has a normal mood and affect.  His speech is normal and behavior is normal.     ECG: Normal sinus rhythm with RBBB    Lab Results   Component Value Date    TRIG 199 01/09/2020    HDL 35 01/09/2020    HDL 41 02/28/2012    LDLCALC 70 01/09/2020    LDLDIRECT 88 05/23/2016    LABVLDL 40 01/09/2020     Lab Results   Component Value Date     01/09/2020    K 4.8 01/09/2020    BUN 17 01/09/2020    CREATININE 1.0 01/09/2020         Assessment:  1. NSTEMI  2. Essential Hypertension    Plan:  Augusto Moss warrants a left heart catheterization for his NSTEMI. I discussed the risks and benefits of cardiac catheterization with the patient. I also discussed the possible therapies including medical management, angioplasty and stenting or coronary bypass surgery. The patient is amenable to undergoing the procedure. We will have this scheduled.

## 2020-07-21 VITALS
HEIGHT: 67 IN | BODY MASS INDEX: 28.34 KG/M2 | WEIGHT: 180.56 LBS | HEART RATE: 61 BPM | SYSTOLIC BLOOD PRESSURE: 130 MMHG | DIASTOLIC BLOOD PRESSURE: 71 MMHG | TEMPERATURE: 98.2 F | RESPIRATION RATE: 16 BRPM | OXYGEN SATURATION: 96 %

## 2020-07-21 LAB
ANION GAP SERPL CALCULATED.3IONS-SCNC: 11 MMOL/L (ref 3–16)
BUN BLDV-MCNC: 14 MG/DL (ref 7–20)
CALCIUM SERPL-MCNC: 9 MG/DL (ref 8.3–10.6)
CHLORIDE BLD-SCNC: 103 MMOL/L (ref 99–110)
CHOLESTEROL, TOTAL: 135 MG/DL (ref 0–199)
CO2: 22 MMOL/L (ref 21–32)
CREAT SERPL-MCNC: 1 MG/DL (ref 0.9–1.3)
EKG ATRIAL RATE: 58 BPM
EKG DIAGNOSIS: NORMAL
EKG P AXIS: 5 DEGREES
EKG P-R INTERVAL: 140 MS
EKG Q-T INTERVAL: 474 MS
EKG QRS DURATION: 152 MS
EKG QTC CALCULATION (BAZETT): 465 MS
EKG R AXIS: -16 DEGREES
EKG T AXIS: 43 DEGREES
EKG VENTRICULAR RATE: 58 BPM
ESTIMATED AVERAGE GLUCOSE: 168.6 MG/DL
GFR AFRICAN AMERICAN: >60
GFR NON-AFRICAN AMERICAN: >60
GLUCOSE BLD-MCNC: 134 MG/DL (ref 70–99)
HBA1C MFR BLD: 7.5 %
HDLC SERPL-MCNC: 33 MG/DL (ref 40–60)
LDL CHOLESTEROL CALCULATED: 59 MG/DL
PLATELET # BLD: 198 K/UL (ref 135–450)
POTASSIUM SERPL-SCNC: 4 MMOL/L (ref 3.5–5.1)
SODIUM BLD-SCNC: 136 MMOL/L (ref 136–145)
TRIGL SERPL-MCNC: 214 MG/DL (ref 0–150)
TROPONIN: 0.15 NG/ML
VLDLC SERPL CALC-MCNC: 43 MG/DL

## 2020-07-21 PROCEDURE — 84484 ASSAY OF TROPONIN QUANT: CPT

## 2020-07-21 PROCEDURE — 85049 AUTOMATED PLATELET COUNT: CPT

## 2020-07-21 PROCEDURE — 80061 LIPID PANEL: CPT

## 2020-07-21 PROCEDURE — 83036 HEMOGLOBIN GLYCOSYLATED A1C: CPT

## 2020-07-21 PROCEDURE — 36415 COLL VENOUS BLD VENIPUNCTURE: CPT

## 2020-07-21 PROCEDURE — 6370000000 HC RX 637 (ALT 250 FOR IP): Performed by: INTERNAL MEDICINE

## 2020-07-21 PROCEDURE — 94760 N-INVAS EAR/PLS OXIMETRY 1: CPT

## 2020-07-21 PROCEDURE — 99239 HOSP IP/OBS DSCHRG MGMT >30: CPT | Performed by: NURSE PRACTITIONER

## 2020-07-21 PROCEDURE — 80048 BASIC METABOLIC PNL TOTAL CA: CPT

## 2020-07-21 PROCEDURE — 2580000003 HC RX 258: Performed by: INTERNAL MEDICINE

## 2020-07-21 RX ORDER — ROSUVASTATIN CALCIUM 40 MG/1
40 TABLET, COATED ORAL NIGHTLY
Status: DISCONTINUED | OUTPATIENT
Start: 2020-07-21 | End: 2020-07-21 | Stop reason: HOSPADM

## 2020-07-21 RX ORDER — CLOPIDOGREL BISULFATE 75 MG/1
1 TABLET ORAL DAILY
Status: DISCONTINUED | OUTPATIENT
Start: 2020-07-21 | End: 2020-07-21 | Stop reason: SDUPTHER

## 2020-07-21 RX ORDER — ASPIRIN 81 MG/1
81 TABLET ORAL DAILY
Status: DISCONTINUED | OUTPATIENT
Start: 2020-07-21 | End: 2020-07-21 | Stop reason: HOSPADM

## 2020-07-21 RX ORDER — ESCITALOPRAM OXALATE 10 MG/1
10 TABLET ORAL DAILY
Status: DISCONTINUED | OUTPATIENT
Start: 2020-07-21 | End: 2020-07-21 | Stop reason: SDUPTHER

## 2020-07-21 RX ORDER — ATENOLOL 50 MG/1
50 TABLET ORAL DAILY
Status: DISCONTINUED | OUTPATIENT
Start: 2020-07-21 | End: 2020-07-21 | Stop reason: HOSPADM

## 2020-07-21 RX ORDER — EZETIMIBE 10 MG/1
10 TABLET ORAL DAILY
Status: DISCONTINUED | OUTPATIENT
Start: 2020-07-21 | End: 2020-07-21 | Stop reason: HOSPADM

## 2020-07-21 RX ORDER — NITROGLYCERIN 0.4 MG/1
0.4 TABLET SUBLINGUAL EVERY 5 MIN PRN
Qty: 25 TABLET | Refills: 3 | Status: SHIPPED | OUTPATIENT
Start: 2020-07-21 | End: 2020-07-29 | Stop reason: SDUPTHER

## 2020-07-21 RX ORDER — METFORMIN HYDROCHLORIDE 500 MG/1
1000 TABLET, EXTENDED RELEASE ORAL 2 TIMES DAILY WITH MEALS
Status: DISCONTINUED | OUTPATIENT
Start: 2020-07-21 | End: 2020-07-21 | Stop reason: HOSPADM

## 2020-07-21 RX ORDER — ESCITALOPRAM OXALATE 10 MG/1
10 TABLET ORAL DAILY
Status: DISCONTINUED | OUTPATIENT
Start: 2020-07-21 | End: 2020-07-21 | Stop reason: HOSPADM

## 2020-07-21 RX ORDER — ASPIRIN 81 MG/1
81 TABLET, CHEWABLE ORAL DAILY
Status: DISCONTINUED | OUTPATIENT
Start: 2020-07-21 | End: 2020-07-21 | Stop reason: SDUPTHER

## 2020-07-21 RX ORDER — CLOPIDOGREL BISULFATE 75 MG/1
75 TABLET ORAL DAILY
Status: DISCONTINUED | OUTPATIENT
Start: 2020-07-21 | End: 2020-07-21 | Stop reason: HOSPADM

## 2020-07-21 RX ADMIN — EZETIMIBE 10 MG: 10 TABLET ORAL at 09:53

## 2020-07-21 RX ADMIN — CLOPIDOGREL BISULFATE 75 MG: 75 TABLET ORAL at 08:37

## 2020-07-21 RX ADMIN — ATENOLOL 50 MG: 50 TABLET ORAL at 09:45

## 2020-07-21 RX ADMIN — SODIUM CHLORIDE, PRESERVATIVE FREE 10 ML: 5 INJECTION INTRAVENOUS at 07:26

## 2020-07-21 RX ADMIN — ESCITALOPRAM OXALATE 10 MG: 10 TABLET ORAL at 08:37

## 2020-07-21 RX ADMIN — ASPIRIN 81 MG: 81 TABLET, CHEWABLE ORAL at 08:37

## 2020-07-21 RX ADMIN — METFORMIN HYDROCHLORIDE 1000 MG: 500 TABLET, EXTENDED RELEASE ORAL at 09:45

## 2020-07-21 ASSESSMENT — PAIN SCALES - GENERAL: PAINLEVEL_OUTOF10: 0

## 2020-07-21 NOTE — PROGRESS NOTES
IV and telemetry monitor removed. Discharge paperwork completed and discussed with the patient and wife. All questions answered. Patient has been made aware of follow up appointments that have been made/need to be made and patient verbalized understanding. Patient has been made aware of medication that was filled with [facility] and that patient needs to pick it up prior to leaving the hospital. All belongings have been packed up and sent with the patient. Patient will be driven home by patient's wife.

## 2020-07-21 NOTE — PROGRESS NOTES
[facility]   Daily Progress Note      Admit Date:  7/20/2020    Reason for follow up visit: Chest pain    CC: \"I feel well. \"    63 y/o male with PMH significant for generalized anxiety disorder, severe PAD with infrarenal aortic occlusion and CAD with prior PCI who presented to [facility] ED with episodes of chest pain. He had been a t [facility] over the weekend after presenting with a NSTEMI but left d/t severe anxiety. He underwent LHC on 7/20/2020 with resultant KUMAR to LAD. Has been progressively ambulated and ready for discharge. Interval History:  Pt. seen and examined; records reviewed  BP noted. On room air  Denies chest pain or SOB  Ambulating without complaints    Subjective:  Pt with no acute overnight cardiac events. Denies chest pain, SOB, cough, PND, palpitations or dizziness    Review of Systems:   · Constitutional: no unanticipated weight loss. There's been no change in energy level, sleep pattern, or activity level. No fevers, chills. · Eyes: No visual changes or diplopia. No scleral icterus. · ENT: No Headaches, hearing loss or vertigo. No mouth sores or sore throat. · Cardiovascular: as reviewed in HPI  · Respiratory: No cough or wheezing, no sputum production. No hematemesis. · Gastrointestinal: No abdominal pain, appetite loss, blood in stools. No change in bowel or bladder habits. · Genitourinary: No dysuria, trouble voiding, or hematuria. · Musculoskeletal:  No gait disturbance, no joint complaints. · Integumentary: No rash or pruritis. · Neurological: No headache, diplopia, change in muscle strength, numbness or tingling. · Psychiatric: + anxiety  · Endocrine: No temperature intolerance. No excessive thirst, fluid intake, or urination. No tremor. · Hematologic/Lymphatic: No abnormal bruising or bleeding, blood clots or swollen lymph nodes. · Allergic/Immunologic: No nasal congestion or hives.     Objective:   /71   Pulse 61   Temp 98.2 °F (36.8 °C) (Oral)   Resp 16   Ht 5' 7\" (1.702 m)   Wt 180 lb 8.9 oz (81.9 kg)   SpO2 96%   BMI 28.28 kg/m²       Intake/Output Summary (Last 24 hours) at 7/21/2020 1032  Last data filed at 7/21/2020 0441  Gross per 24 hour   Intake 840 ml   Output 300 ml   Net 540 ml     Wt Readings from Last 3 Encounters:   07/21/20 180 lb 8.9 oz (81.9 kg)   01/09/20 192 lb (87.1 kg)   07/19/19 200 lb (90.7 kg)       Physical Exam:  General: In no acute distress. Awake, alert, and oriented X4. Ambulating without complaints  Skin:  Warm and dry. No new appearing rashes or lesions. Neck:  Supple. No JVD appreciated. Chest: Lungs clear to auscultation. No wheezes/rhonchi/rales  Cardiovascular:  RRR. Normal S1 and S2. No murmur/gallop or rub  Abdomen:  soft, nontender, nondistended, +bowel sounds. No hepatomegaly  Extremities:  No LE edema. No clubbing or cyanosis. R radial site unremarkable. 2+ bilateral radial/Dp/PT pulses. Cap refill brisk. Medications:    clopidogrel  75 mg Oral Daily    escitalopram  10 mg Oral Daily    aspirin  81 mg Oral Daily    metFORMIN  1,000 mg Oral BID WC    rosuvastatin  40 mg Oral Nightly    ezetimibe  10 mg Oral Daily    atenolol  50 mg Oral Daily    sodium chloride flush  10 mL Intravenous 2 times per day       sodium chloride flush, acetaminophen, ondansetron, hydrALAZINE    Lab Data:  CBC:   Recent Labs     07/20/20  1355 07/21/20  0423   WBC 7.2  --    HGB 14.0  --     198     BMP:    Recent Labs     07/20/20  1355 07/21/20  0423    136   K 4.0 4.0   CO2 21 22   BUN 15 14   CREATININE 0.9 1.0     Results for Seema Harley (MRN [id number]) as of 7/21/2020 10:40   Ref.  Range 7/21/2020 04:23   Troponin Latest Ref Range: <0.01 ng/mL 0.15 (H)   Cholesterol, Total Latest Ref Range: 0 - 199 mg/dL 135   HDL Cholesterol Latest Ref Range: 40 - 60 mg/dL 33 (L)   LDL Calculated Latest Ref Range: <100 mg/dL 59   Triglycerides Latest Ref Range: 0 - 150 mg/dL 214 (H)   VLDL Cholesterol Calculated Latest Ref Range: Not Established mg/dL 43     ECG: SR with RBBB    7/20/2020 LHC/PCI:  1. Right-dominant coronary arterial circulation with 100% occlusion of the proximal right coronary artery. There are collaterals from the right to the distal right and also from the left. In the left system, there is no left main disease. There is a 60% midcircumflex lesion and the first obtuse marginal branch is occluded and what appears to be in-stent. The left anterior descending artery had a 99% lesion in-stent  in the midportion. This was successfully intervened upon with a 3 mm x 22-mm [place] drug-eluting stent post dilated to 3.34 mm in diameter. The first diagonal branch appears to be occluded in-stent as well. 2.  Normal left ventricular systolic function. LV ejection fraction of 55%. 3.  Normal left ventricular end-diastolic pressure. 4.  No gradient across the aortic valve on pullback to suggest aortic stenosis. Telemetry: SR without ectopy    Assessment/Plan:    1. NSTEMI/Multivessel CAD  -S/P KUMAR to LAD; multivessel disease  -prior stents to LAD, D1 and OM1  -preserved LVEF  -no further angina  -continue medical management with DAPT, statin and BB    2. Hyperlipidemia  -goal LDL < 70  -high intensity statin    3. Smoker  -smoking cessation emphasized    4. PAD  -follows with Dr. Bob Carter  -continue antiplatelet and statin    5. Generalized anxiety  -Lexapro added    Stable and okay for discharge today    Follow up with Dr. Earl/cardiology in few weeks    Post PCI/LHC instructions discussed    Low fat/low sodium diet and regular exercise discussed. Cardiac rehab discussed. Emphasized and discussed strategies for smoking cessation (> 3-5 minutes of counseling given)    Lengthy discussion with Jay Keane and his wife regarding his CAD and procedure results (approximately 40 minutes spent with pt and his wife.      Discharge Meds:  Plavix 75 mg daily  Lexapro 10 mg daily  ASA 81 mg daily  Metformin 1000 mg BID  Rosuvastatin 40 mg daily  Zetia 10 mg daily  Atenolol 50 mg daily  NTG 0.4 mg SL as needed for chest pain      Electronically signed by ANITA Potter CNP on 7/21/2020 at 10:32 AM

## 2020-07-21 NOTE — DISCHARGE SUMMARY
[address] [place] [address] SUMMARY      Patient ID:  Christine Cheng  [id number] 49 y.o. 1960    Admit date: 7/20/2020    Discharge date:  07/21/2020    Admitting Physician: Rebecca Barrera MD     Discharge Physician: Alexia Davis     Admission Diagnoses: Cardiac chest pain [R07.9]  CAD S/P percutaneous coronary angioplasty [I25.10, Z98.61]  NSTEMI (non-ST elevated myocardial infarction) [facility]) [I21.4]    Discharge Diagnoses:   1. NSTEMI/Multivessel CAD  -S/P KUMAR to LAD; multivessel disease     2. Hyperlipidemia     3. Smoker    4. PAD     5. Generalized anxiety    Discharged Condition: good    Hospital Course: Christine Cheng was admitted with chest pain. He is a 65 y/o male with PMH significant for generalized anxiety disorder, severe PAD with infrarenal aortic occlusion and CAD with prior PCI who presented to [facility] ED with episodes of chest pain. He had been a t [facility] over the weekend after presenting with a NSTEMI but left d/t severe anxiety. He underwent LHC on 7/20/2020 with resultant KUMAR to LAD. Has been progressively ambulated and ready for discharge. Consults:  IP CONSULT TO CARDIOLOGY  IP CONSULT TO CARDIAC REHAB    Significant Diagnostic Studies:     ECG: SR with RBBB     7/20/2020 LHC/PCI:  1.  Right-dominant coronary arterial circulation with 100% occlusion of the proximal right coronary artery.  There are collaterals from the right to the distal right and also from the left.  In the left system, there is no left main disease.  There is a 60% midcircumflex lesion and the first obtuse marginal branch is occluded and what appears to be in-stent.  The left anterior descending artery had a 99% lesion in-stent  in the midportion.  This was successfully intervened upon with a 3 mm x 22-mm [place] drug-eluting stent post dilated to 3.34 mm in diameter.  The first diagonal branch appears to be occluded in-stent as well.   2.  Normal left ventricular systolic function.  LV ejection fraction of 55%. 3.  Normal left ventricular end-diastolic pressure. 4.  No gradient across the aortic valve on pullback to suggest aortic stenosis.     Labs:   Lab Results   Component Value Date    CREATININE 1.0 07/21/2020    BUN 14 07/21/2020     07/21/2020    K 4.0 07/21/2020     07/21/2020    CO2 22 07/21/2020      Lab Results   Component Value Date    WBC 7.2 07/20/2020    HGB 14.0 07/20/2020    HCT 41.6 07/20/2020    MCV 92.7 07/20/2020     07/21/2020    No results found for: INR, PROTIME No results found for: BNP    Disposition: home    Patient Instructions:    Cory Emmanuel   Home Medication Instructions [facility]:[id number]    Printed on:07/21/20 1052   Medication Information                      aspirin 81 MG EC tablet  TAKE 1 TABLET BY MOUTH EVERY DAY             atenolol (TENORMIN) 50 MG tablet  Take 1 tablet by mouth daily             clopidogrel (PLAVIX) 75 MG tablet  TAKE 1 TABLET BY MOUTH EVERY DAY             escitalopram (LEXAPRO) 10 MG tablet  Take 1 tablet by mouth daily             ezetimibe (ZETIA) 10 MG tablet  Take 1 tablet by mouth daily             metFORMIN (GLUCOPHAGE-XR) 500 MG extended release tablet  Take 2 tablets by mouth 2 times daily             nitroGLYCERIN (NITROSTAT) 0.4 MG SL tablet  Place 1 tablet under the tongue every 5 minutes as needed for Chest pain up to max of 3 total doses. If no relief after 1 dose, call 911. rosuvastatin (CRESTOR) 40 MG tablet  Take 1 tablet by mouth daily                 Follow-up with cardiology/Dr. Sherrie Clark in 2-3 weeks    Please see today's progress noted for discharge physical exam and further instructions.     40 minutes spent on pt's discharge    Signed:  ANITA Farmer CNP on 7/21/2020 at 10:55 AM    [facility], [facility], [address] [place] [address]  Phone: [phone]  Fax: [phone]

## 2020-07-21 NOTE — PLAN OF CARE
Problem: Falls - Risk of:  Goal: Will remain free from falls  Description: Will remain free from falls  Outcome: Ongoing  Note: Fall risk assessment completed every shift. All precautions in place. Pt has call light within reach at all times. Room clear of clutter. Pt aware to call for assistance when getting up. Patient assessed for fall risk; fall precautions initiated. Patient and family instructed about safety devices. Environment kept free of clutter and adequate lighting provided. Bed locked and in lowest position. Call light within reach. Will continue to monitor.       Problem: Cardiac:  Goal: Ability to maintain an adequate cardiac output will improve  Description: Ability to maintain an adequate cardiac output will improve  Outcome: Ongoing

## 2020-07-29 ENCOUNTER — VIRTUAL VISIT (OUTPATIENT)
Dept: PRIMARY CARE CLINIC | Age: 60
End: 2020-07-29
Payer: COMMERCIAL

## 2020-07-29 PROCEDURE — 3051F HG A1C>EQUAL 7.0%<8.0%: CPT | Performed by: INTERNAL MEDICINE

## 2020-07-29 PROCEDURE — 99214 OFFICE O/P EST MOD 30 MIN: CPT | Performed by: INTERNAL MEDICINE

## 2020-07-29 PROCEDURE — 1111F DSCHRG MED/CURRENT MED MERGE: CPT | Performed by: INTERNAL MEDICINE

## 2020-07-29 RX ORDER — NITROGLYCERIN 0.4 MG/1
0.4 TABLET SUBLINGUAL EVERY 5 MIN PRN
Qty: 25 TABLET | Refills: 3 | Status: SHIPPED | OUTPATIENT
Start: 2020-07-29 | End: 2022-04-07 | Stop reason: SDUPTHER

## 2020-07-29 RX ORDER — ATENOLOL 50 MG/1
50 TABLET ORAL DAILY
Qty: 90 TABLET | Refills: 3 | Status: SHIPPED | OUTPATIENT
Start: 2020-07-29 | End: 2021-07-27

## 2020-07-29 RX ORDER — CLOPIDOGREL BISULFATE 75 MG/1
75 TABLET ORAL DAILY
Qty: 90 TABLET | Refills: 2 | Status: SHIPPED | OUTPATIENT
Start: 2020-07-29 | End: 2021-05-27

## 2020-07-29 RX ORDER — ROSUVASTATIN CALCIUM 40 MG/1
40 TABLET, COATED ORAL DAILY
Qty: 90 TABLET | Refills: 3 | Status: SHIPPED | OUTPATIENT
Start: 2020-07-29 | End: 2021-07-27

## 2020-07-29 RX ORDER — EZETIMIBE 10 MG/1
10 TABLET ORAL DAILY
Qty: 90 TABLET | Refills: 3 | Status: SHIPPED | OUTPATIENT
Start: 2020-07-29 | End: 2021-07-27

## 2020-07-29 RX ORDER — METFORMIN HYDROCHLORIDE 500 MG/1
1000 TABLET, EXTENDED RELEASE ORAL 2 TIMES DAILY
Qty: 180 TABLET | Refills: 5 | Status: SHIPPED | OUTPATIENT
Start: 2020-07-29 | End: 2021-07-08

## 2020-07-29 RX ORDER — ASPIRIN 81 MG/1
81 TABLET ORAL DAILY
Qty: 90 TABLET | Refills: 5 | Status: SHIPPED | OUTPATIENT
Start: 2020-07-29 | End: 2021-08-27

## 2020-07-29 ASSESSMENT — ENCOUNTER SYMPTOMS
CHEST TIGHTNESS: 0
CONSTIPATION: 0
DIARRHEA: 0
COUGH: 0
WHEEZING: 0
BLOOD IN STOOL: 0
SHORTNESS OF BREATH: 0
GASTROINTESTINAL NEGATIVE: 1
EYES NEGATIVE: 1

## 2020-07-29 NOTE — PROGRESS NOTES
has a normal mood and affect. His behavior is normal.   Vitals reviewed. Assessment:     Kimberlee Novak is a 64 y.o. male evaluated via [address]  on 7/29/2020. Consent:  He and/or health care decision maker is aware that that he may receive a bill for this telephone service, depending on his insurance coverage, and has provided verbal consent to proceed: Yes      Documentation:  I communicated with the patient about this . Details of this discussion including any medical advice provided: as noted       I affirm this is a Patient Initiated Episode with a Patient who has not had a related appointment within my department in the past 7 days or scheduled within the next 24 hours. Patient identification was verified at the start of the visit: Yes    Total Time: minutes: 21-30 minutes    Note: not billable if this call serves to triage the patient into an appointment for the relevant concern      Pasquale Grossman was seen today for follow-up from hospital.    Diagnoses and all orders for this visit:    Hospital discharge follow-up  -     [place] DISCHARGE MEDS RECONCILED W/ CURRENT OUTPATIENT MED LIST    Coronary artery disease without angina pectoris, unspecified vessel or lesion type, unspecified whether native or transplanted heart  -     nitroGLYCERIN (NITROSTAT) 0.4 MG SL tablet; Place 1 tablet under the tongue every 5 minutes as needed for Chest pain up to max of 3 total doses. If no relief after 1 dose, call 911.  -     clopidogrel (PLAVIX) 75 MG tablet; Take 1 tablet by mouth daily  -     rosuvastatin (CRESTOR) 40 MG tablet; Take 1 tablet by mouth daily  -     ezetimibe (ZETIA) 10 MG tablet; Take 1 tablet by mouth daily  -     atenolol (TENORMIN) 50 MG tablet; Take 1 tablet by mouth daily  -     aspirin 81 MG EC tablet; Take 1 tablet by mouth daily    Essential hypertension  -     atenolol (TENORMIN) 50 MG tablet;  Take 1 tablet by mouth daily    Type 2 diabetes mellitus without complication, without long-term current use of insulin (HCC)  A1C much improved at 7.5   Advised reg daily exercise + low carb diet   -     metFORMIN (GLUCOPHAGE-XR) 500 MG extended release tablet; Take 2 tablets by mouth 2 times daily  -     rosuvastatin (CRESTOR) 40 MG tablet; Take 1 tablet by mouth daily  -     ezetimibe (ZETIA) 10 MG tablet; Take 1 tablet by mouth daily  -     aspirin 81 MG EC tablet; Take 1 tablet by mouth daily    Mixed hyperlipidemia on Crestor + Zetia   -     ezetimibe (ZETIA) 10 MG tablet; Take 1 tablet by mouth daily    Smoker cautioned very strongly . He and his wife are actively trying to quit           Anxiety  Controlled with Lexapro 10 + 20 mg / d   -     escitalopram (LEXAPRO) 10 MG tablet; Take 1 tablet by mouth daily  -     escitalopram (LEXAPRO) 20 MG tablet; Take 1 tablet by mouth daily    Screen for colon cancer still pending   -     Cologuaandreia (For External Results Only); Future  -     AFL - Michael Farmer MD, Gastroenterology, [facility]            Bilateral carotid artery disease [facility]) Sees Vascular for q 6 mth                  Plan:      Self Management Goals.    Know which medication is for what condition:   Know side effects of medications, and discuss with doctor   Discuss side effects and instructions on new medications  Know correct dose/frequency of medications  Take medications at the same time each day  Stay current on medication refills  If taking OTC's check with MD/pharmacy first about interactions  Monitor sugars and record time/meal    LDL goal 340 or less  Systolic BP < or equal to 723  Diastolic BP < or equal to 85  Quota system, 10 number of cigarettes per day  Nicoderm and or Chantix   Advised  Flu and Pneumonia Vax  Set targets for weight loss 4 lbs per month  Exercise 3-5 times per week  Keep check of weight

## 2020-08-06 ENCOUNTER — HOSPITAL ENCOUNTER (OUTPATIENT)
Dept: VASCULAR LAB | Age: 60
Discharge: HOME | End: 2020-08-06
Payer: COMMERCIAL

## 2020-08-06 PROCEDURE — 93880 EXTRACRANIAL BILAT STUDY: CPT

## 2020-08-10 ENCOUNTER — TELEPHONE (OUTPATIENT)
Dept: VASCULAR SURGERY | Age: 60
End: 2020-08-10

## 2020-08-10 NOTE — TELEPHONE ENCOUNTER
Left message with results of carotid duplex which shows known left ICA occlusion and 50-69% stenosis of right ICA. Will plan to repeat carotid duplex in 6 months.     Electronically signed by ANITA Doherty CNP on 8/10/2020 at 1:00 PM

## 2020-08-11 NOTE — PROGRESS NOTES
Family History   Problem Relation Age of Onset    Heart Disease Father     High Blood Pressure Father     High Cholesterol Father     High Blood Pressure Mother      Social History     Tobacco Use    Smoking status: Current Every Day Smoker     Packs/day: 0.50     Years: 20.00     Pack years: 10.00     Types: Cigarettes    Smokeless tobacco: Current User   Substance Use Topics    Alcohol use: Yes     Comment: occassional    Drug use: No       No Known Allergies  Current Outpatient Medications   Medication Sig Dispense Refill    nitroGLYCERIN (NITROSTAT) 0.4 MG SL tablet Place 1 tablet under the tongue every 5 minutes as needed for Chest pain up to max of 3 total doses. If no relief after 1 dose, call 911. 25 tablet 3    clopidogrel (PLAVIX) 75 MG tablet Take 1 tablet by mouth daily 90 tablet 2    metFORMIN (GLUCOPHAGE-XR) 500 MG extended release tablet Take 2 tablets by mouth 2 times daily 180 tablet 5    rosuvastatin (CRESTOR) 40 MG tablet Take 1 tablet by mouth daily 90 tablet 3    ezetimibe (ZETIA) 10 MG tablet Take 1 tablet by mouth daily 90 tablet 3    atenolol (TENORMIN) 50 MG tablet Take 1 tablet by mouth daily 90 tablet 3    aspirin 81 MG EC tablet Take 1 tablet by mouth daily 90 tablet 5    escitalopram (LEXAPRO) 10 MG tablet Take 1 tablet by mouth daily 90 tablet 5     No current facility-administered medications for this visit. Physical Exam:   /78   Pulse 66   Temp 98.6 °F (37 °C)   Ht 5' 7\" (1.702 m)   Wt 187 lb 12.8 oz (85.2 kg)   SpO2 97%   BMI 29.41 kg/m²   Wt Readings from Last 2 Encounters:   08/12/20 187 lb 12.8 oz (85.2 kg)   07/21/20 180 lb 8.9 oz (81.9 kg)     Constitutional: He is oriented to person, place, and time. He appears well-developed and well-nourished. In no acute distress. HEENT: Normocephalic and atraumatic. Sclerae anicteric. No xanthelasmas. EOM's intact. Neck: Supple. No JVD present. Carotids without bruits. No  thyromegaly present.  No lymphadenopathy present. Cardiovascular: RRR, normal S1 and S2; no murmur/gallop or rub  Pulmonary/Chest: Effort normal.  Lungs clear to auscultation. Chest wall nontender  Abdominal: soft, nontender, nondistended. + bowel sounds; no hepatomegaly   Extremities: No edema, cyanosis, or clubbing. Pulses are 2+ radial/DP/PT bilaterally. Cap refill brisk. Neurological: No focal deficit. Skin: Skin is warm and dry. Psychiatric: He has a normal mood and affect. His speech is normal and behavior is normal.     Lab Review:   Lab Results   Component Value Date    TRIG 214 07/21/2020    HDL 33 07/21/2020    HDL 41 02/28/2012    LDLCALC 59 07/21/2020    LDLDIRECT 88 05/23/2016    LABVLDL 43 07/21/2020     Lab Results   Component Value Date     07/21/2020    K 4.0 07/21/2020     07/21/2020    CO2 22 07/21/2020    BUN 14 07/21/2020    CREATININE 1.0 07/21/2020    GLUCOSE 134 07/21/2020    CALCIUM 9.0 07/21/2020      Lab Results   Component Value Date    WBC 7.2 07/20/2020    HGB 14.0 07/20/2020    HCT 41.6 07/20/2020    MCV 92.7 07/20/2020     07/21/2020 8/12/2020 ECG:  SR with RBBB    Carotid US 8/6/2020: There is 50-69% stenosis of the right internal carotid arteries.     The left common carotid and internal carotid artery are occluded.     The right vertebral artery demonstrates normal antegrade flow.     The left vertebral artery demonstrates abnormal hesitant flow.     The left and right subclavian artery is visualized and demonstrates     turbulent flow.         7/20/2020 LHC/PCI:  1.  Right-dominant coronary arterial circulation with 100% occlusion of the proximal right coronary artery.  There are collaterals from the right to the distal right and also from the left.  In the left system, there is no left main disease. Piotr Hearing is a 60% midcircumflex lesion and the first obtuse marginal branch is occluded and what appears to be in-stent.  The left anterior descending artery had a 99% lesion in-stent  in the midportion.  This was successfully intervened upon with a 3 mm x 22-mm Colt drug-eluting stent post dilated to 3.34 mm in diameter.  The first diagonal branch appears to be occluded in-stent as well. 2.  Normal left ventricular systolic function.  LV ejection fraction of 55%. 3.  Normal left ventricular end-diastolic pressure. 4.  No gradient across the aortic valve on pullback to suggest aortic stenosis    8/12/2011 Coronary angiogram/PCI:  CONCLUSION:  1. Successful procedure. This involved 3 vessels, LAD, 1st diagonal and OM1.    2.  LAD had an 80% lesion which was reduced to 0% using a 3.0 x 12 Xience. 3.  The 1st diagonal has a proximal lesion of 80-90% which was reduced to 0% using 2.25 x 12 Xience. 4.  The OM1 was a very long diseased vessel with the lesion reduced from 80% to 0% using a 2.25 x 28 mm long stent. 7/27/2011 Coronary angiogram:   1.  3 vessel coronary artery disease. 2.  Normal left ventricular size and systolic function. 3.  Mid left anterior descending 90%. 4.  Proximal large diagonal 80%. 5.  Proximal obtuse marginal 1 diffuse disease in the 80% range. 6.  Mid right coronary artery has a total occlusion with bridging collaterals. 7.  Normal left ventricular function. 9.  90% left subclavian stenosis. 10. 90% left renal artery stenosis. 11. The patient also has an occluded distal aorta chronically. Assessment:    1. NSTEMI (non-ST elevated myocardial infarction) ([address]  -multiple vessle CAD with prior multivessel revascularizations (stents to LCX, LAD, Dg1 and OM1)  -S/P KUMAR to LAD; multivessel disease (7/2020)  -angina quiet  -continue medical management with DAPT, statin and BB  -risk factor modification    2. Hyperlipidemia LDL goal <70  -continue high intensity statin    3. Smoker  -smoking cessation emphasized and discussed    4. PAD/Carotid stenosis  -prior L subclavian stent  -recent carotid US noted  -follows with Dr. Sravanthi Khanna    5. Diabetes mellitus, type II  -Rx per PCP    Plan:  Continue ASA, plavix, atenolol, zetia and crestor  Discussed low fat/low sodium diet and reinforced regular aerobic exercise. Discussed cardiac rehab and he declines  Emphasized and discussed strategies for smoking cessation (> 3-5 minutes of counseling given)  Labs from 7/21/2020 reviewed  Approximately 25-30 minutes spent with patient and > 50% of time spent on pt education, answering questions and coordinating care. Follow up with Dr. Domonique Boyd in 3-4 months or sooner if needed    Return in about 3 months (around 11/12/2020) for 3-4 months with Dr. Domonique Boyd. Thanks for allowing me to participate in the care of this patient.       EDWARD Sla  [facility], [address] [place] [address] [address] [place] [address] [address], [address] [place] [address]  Office: [phone]  Fax: [phone]      Electronically signed by ANITA Casanova CNP on 8/12/2020 at 2:34 PM

## 2020-08-12 ENCOUNTER — OFFICE VISIT (OUTPATIENT)
Dept: CARDIOLOGY CLINIC | Age: 60
End: 2020-08-12
Payer: COMMERCIAL

## 2020-08-12 VITALS
HEIGHT: 67 IN | HEART RATE: 66 BPM | SYSTOLIC BLOOD PRESSURE: 118 MMHG | WEIGHT: 187.8 LBS | BODY MASS INDEX: 29.47 KG/M2 | OXYGEN SATURATION: 97 % | TEMPERATURE: 98.6 F | DIASTOLIC BLOOD PRESSURE: 78 MMHG

## 2020-08-12 PROCEDURE — 99214 OFFICE O/P EST MOD 30 MIN: CPT | Performed by: NURSE PRACTITIONER

## 2020-08-12 PROCEDURE — 93000 ELECTROCARDIOGRAM COMPLETE: CPT | Performed by: NURSE PRACTITIONER

## 2021-01-26 DIAGNOSIS — F41.9 ANXIETY: ICD-10-CM

## 2021-01-26 RX ORDER — ESCITALOPRAM OXALATE 10 MG/1
TABLET ORAL
Qty: 90 TABLET | Refills: 3 | Status: SHIPPED | OUTPATIENT
Start: 2021-01-26 | End: 2022-01-18

## 2021-01-26 NOTE — TELEPHONE ENCOUNTER
Medication:   Requested Prescriptions     Pending Prescriptions Disp Refills    escitalopram (LEXAPRO) 10 MG tablet [Pharmacy Med Name: ESCITALOPRAM 10 MG TABLET] 90 tablet 3     Sig: TAKE 1 TABLET BY MOUTH EVERY DAY        Last Filled:      Patient Phone Number: [phone] (home)     Last appt: 7/29/2020   Next appt: Visit date not found    Last OARRS: No flowsheet data found.

## 2021-02-10 DIAGNOSIS — F41.9 ANXIETY: ICD-10-CM

## 2021-02-10 RX ORDER — ESCITALOPRAM OXALATE 20 MG/1
TABLET ORAL
Qty: 90 TABLET | Refills: 5 | Status: SHIPPED | OUTPATIENT
Start: 2021-02-10 | End: 2022-02-28

## 2021-02-10 NOTE — TELEPHONE ENCOUNTER
Medication:   Requested Prescriptions     Pending Prescriptions Disp Refills    escitalopram (LEXAPRO) 20 MG tablet [Pharmacy Med Name: ESCITALOPRAM 20 MG TABLET] 90 tablet 5     Sig: TAKE 1 TABLET BY MOUTH EVERY DAY        Last Filled:      Patient Phone Number: [phone] (home)     Last appt: 7/29/2020   Next appt: Visit date not found    Last OARRS: No flowsheet data found.

## 2021-05-27 DIAGNOSIS — I25.10 CORONARY ARTERY DISEASE WITHOUT ANGINA PECTORIS, UNSPECIFIED VESSEL OR LESION TYPE, UNSPECIFIED WHETHER NATIVE OR TRANSPLANTED HEART: ICD-10-CM

## 2021-05-27 RX ORDER — CLOPIDOGREL BISULFATE 75 MG/1
TABLET ORAL
Qty: 90 TABLET | Refills: 2 | Status: SHIPPED | OUTPATIENT
Start: 2021-05-27 | End: 2022-04-07 | Stop reason: SDUPTHER

## 2021-05-27 NOTE — TELEPHONE ENCOUNTER
Medication:   Requested Prescriptions     Pending Prescriptions Disp Refills    clopidogrel (PLAVIX) 75 MG tablet [Pharmacy Med Name: CLOPIDOGREL 75 MG TABLET] 90 tablet 2     Sig: TAKE 1 TABLET BY MOUTH EVERY DAY        Last Filled:      Patient Phone Number: [phone] (home)     Last appt: 7/29/2020   Next appt: Visit date not found    Last OARRS: No flowsheet data found.

## 2021-07-07 DIAGNOSIS — E11.9 TYPE 2 DIABETES MELLITUS WITHOUT COMPLICATION, WITHOUT LONG-TERM CURRENT USE OF INSULIN (HCC): ICD-10-CM

## 2021-07-08 RX ORDER — METFORMIN HYDROCHLORIDE 500 MG/1
TABLET, EXTENDED RELEASE ORAL
Qty: 360 TABLET | Refills: 2 | Status: SHIPPED | OUTPATIENT
Start: 2021-07-08 | End: 2022-04-07 | Stop reason: SDUPTHER

## 2021-07-08 NOTE — TELEPHONE ENCOUNTER
Medication:   Requested Prescriptions     Pending Prescriptions Disp Refills    metFORMIN (GLUCOPHAGE-XR) 500 MG extended release tablet [Pharmacy Med Name: METFORMIN HCL  MG TABLET] 360 tablet 2     Sig: TAKE 2 TABLETS BY MOUTH TWICE A DAY       Last Filled:      Patient Phone Number: [phone] (home)     Last appt: 7/29/2020   Next appt: Visit date not found    Last Labs DM:   Lab Results   Component Value Date    LABA1C 7.5 07/21/2020

## 2021-07-13 ENCOUNTER — TELEPHONE (OUTPATIENT)
Dept: SURGERY | Age: 61
End: 2021-07-13

## 2021-07-13 NOTE — TELEPHONE ENCOUNTER
LMOR that we need to reschedule his Carotid US to the hospital since the room in the office will not be up and running.

## 2021-07-22 ENCOUNTER — TELEPHONE (OUTPATIENT)
Dept: SURGERY | Age: 61
End: 2021-07-22

## 2021-07-27 ENCOUNTER — TELEPHONE (OUTPATIENT)
Dept: SURGERY | Age: 61
End: 2021-07-27

## 2021-07-27 DIAGNOSIS — E78.2 MIXED HYPERLIPIDEMIA: ICD-10-CM

## 2021-07-27 DIAGNOSIS — I10 ESSENTIAL HYPERTENSION: ICD-10-CM

## 2021-07-27 DIAGNOSIS — I25.10 CORONARY ARTERY DISEASE WITHOUT ANGINA PECTORIS, UNSPECIFIED VESSEL OR LESION TYPE, UNSPECIFIED WHETHER NATIVE OR TRANSPLANTED HEART: ICD-10-CM

## 2021-07-27 DIAGNOSIS — E11.9 TYPE 2 DIABETES MELLITUS WITHOUT COMPLICATION, WITHOUT LONG-TERM CURRENT USE OF INSULIN (HCC): ICD-10-CM

## 2021-07-27 RX ORDER — ATENOLOL 50 MG/1
TABLET ORAL
Qty: 90 TABLET | Refills: 3 | Status: SHIPPED | OUTPATIENT
Start: 2021-07-27 | End: 2022-04-07 | Stop reason: SDUPTHER

## 2021-07-27 RX ORDER — EZETIMIBE 10 MG/1
TABLET ORAL
Qty: 90 TABLET | Refills: 3 | Status: SHIPPED | OUTPATIENT
Start: 2021-07-27 | End: 2022-04-07 | Stop reason: SDUPTHER

## 2021-07-27 RX ORDER — ROSUVASTATIN CALCIUM 40 MG/1
TABLET, COATED ORAL
Qty: 90 TABLET | Refills: 3 | Status: SHIPPED | OUTPATIENT
Start: 2021-07-27 | End: 2022-04-07 | Stop reason: SDUPTHER

## 2021-07-27 NOTE — TELEPHONE ENCOUNTER
Medication:   Requested Prescriptions     Pending Prescriptions Disp Refills    atenolol (TENORMIN) 50 MG tablet [Pharmacy Med Name: ATENOLOL 50 MG TABLET] 90 tablet 3     Sig: TAKE 1 TABLET BY MOUTH EVERY DAY    rosuvastatin (CRESTOR) 40 MG tablet [Pharmacy Med Name: ROSUVASTATIN CALCIUM 40 MG TAB] 90 tablet 3     Sig: TAKE 1 TABLET BY MOUTH EVERY DAY    ezetimibe ([place]) 10 MG tablet [Pharmacy Med Name: EZETIMIBE 10 MG TABLET] 90 tablet 3     Sig: TAKE 1 TABLET BY MOUTH EVERY DAY       Last Filled:      Patient Phone Number: [phone] (home)     Last appt: 7/29/2020   Next appt: Visit date not found    Last Lipid:   Lab Results   Component Value Date    CHOL 135 07/21/2020    TRIG 214 07/21/2020    HDL 33 07/21/2020    HDL 41 02/28/2012    LDLCALC 59 07/21/2020

## 2021-08-16 ENCOUNTER — TELEPHONE (OUTPATIENT)
Dept: VASCULAR SURGERY | Age: 61
End: 2021-08-16

## 2021-08-16 DIAGNOSIS — I65.23 BILATERAL CAROTID ARTERY STENOSIS: Primary | ICD-10-CM

## 2021-08-17 ENCOUNTER — HOSPITAL ENCOUNTER (OUTPATIENT)
Dept: VASCULAR LAB | Age: 61
Discharge: HOME | End: 2021-08-17
Payer: COMMERCIAL

## 2021-08-17 ENCOUNTER — OFFICE VISIT (OUTPATIENT)
Dept: VASCULAR SURGERY | Age: 61
End: 2021-08-17
Payer: COMMERCIAL

## 2021-08-17 VITALS
HEIGHT: 67 IN | BODY MASS INDEX: 29.98 KG/M2 | WEIGHT: 191 LBS | SYSTOLIC BLOOD PRESSURE: 128 MMHG | DIASTOLIC BLOOD PRESSURE: 80 MMHG

## 2021-08-17 DIAGNOSIS — I65.23 CAROTID ATHEROSCLEROSIS, BILATERAL: Primary | ICD-10-CM

## 2021-08-17 DIAGNOSIS — I65.23 BILATERAL CAROTID ARTERY STENOSIS: ICD-10-CM

## 2021-08-17 PROCEDURE — 93880 EXTRACRANIAL BILAT STUDY: CPT

## 2021-08-17 PROCEDURE — 99213 OFFICE O/P EST LOW 20 MIN: CPT | Performed by: SURGERY

## 2021-08-17 NOTE — PROGRESS NOTES
(NITROSTAT) 0.4 MG SL tablet Place 1 tablet under the tongue every 5 minutes as needed for Chest pain up to max of 3 total doses. If no relief after 1 dose, call 911. 25 tablet 3    aspirin 81 MG EC tablet Take 1 tablet by mouth daily 90 tablet 5     No current facility-administered medications for this visit. Allergies:  Patient has no known allergies. Review of Systems:   · Constitutional: there has been no unanticipated weight loss. There's been no change in energy level, sleep pattern, or activity level. · Eyes: No visual changes or diplopia. No scleral icterus. · ENT: No Headaches, hearing loss or vertigo. No mouth sores or sore throat. · Cardiovascular: Reviewed in HPI  · Respiratory: No cough or wheezing, no sputum production. No hematemesis. · Gastrointestinal: No abdominal pain, appetite loss, blood in stools. No change in bowel or bladder habits. · Genitourinary: No dysuria, trouble voiding, or hematuria. · Musculoskeletal:  No gait disturbance, weakness or joint complaints. · Integumentary: No rash or pruritis. · Neurological: No headache, diplopia, change in muscle strength, numbness or tingling. No change in gait, balance, coordination, mood, affect, memory, mentation, behavior. · Psychiatric: No anxiety, no depression. · Endocrine: No malaise, fatigue or temperature intolerance. No excessive thirst, fluid intake, or urination. No tremor. · Hematologic/Lymphatic: No abnormal bruising or bleeding, blood clots or swollen lymph nodes. · Allergic/Immunologic: No nasal congestion or hives. Physical Examination:    There were no vitals filed for this visit.        General appearance: alert, appears stated age, cooperative and no distress  Head: Normocephalic, without obvious abnormality, atraumatic  Neck: no adenopathy, no carotid bruit, no JVD, supple, symmetrical, trachea midline and thyroid: not enlarged, symmetric, no tenderness/mass/nodules  Lungs: clear to auscultation bilaterally  Heart: regular rate and rhythm, S1, S2 normal, no murmur, click, rub or gallop  Abdomen: soft, non-tender. Bowel sounds normal. No masses,  no organomegaly  Extremities: extremities normal, atraumatic, no cyanosis or edema  Neurologic: Grossly normal    MEDICAL DECISION MAKING/TESTING  I have reviewed the testing personally and my interpretation is below. Right  The right internal carotid artery appears to have a greater than or equal to  70% diameter reducing stenosis based on velocity criteria. The right vertebral artery demonstrates normal antegrade flow. The right subclavian artery is visualized and demonstrates multiphasic flow.     Verbal to Raquel  Left  The left internal carotid artery and common carotid artery reveal chronic  occlusion. Left external carotid artery is patent with collateralization being provided  by retrograde flow in the superior thyroid artery. The left vertebral artery demonstrates normal antegrade flow. The left subclavian artery is visualized and demonstrates multiphasic flow. Assessment:     Patient Active Problem List   Diagnosis    CAD (coronary artery disease)    HTN (hypertension)    PVD (peripheral vascular disease) ([address]    Nonspecific granulomatous    Anxiety    HLD (hyperlipidemia)    Coronary stent    Carotid artery disease ([address]    Erectile dysfunction    Smoker    Subclavian vein stenosis, left    Cardiac chest pain    CAD S/P percutaneous coronary angioplasty    NSTEMI (non-ST elevated myocardial infarction) ([address]       Plan:  1. Carotid atherosclerosis, bilateral  68-year-old male with worsening right carotid atherosclerosis. Duplex would suggest greater than 70% stenosis. Have recommended a CT angiogram of the neck to further evaluate. If greater than 70% would recommend moving forward with intervention. I did discuss with him today both surgery and stenting options.   Based on his age I would recommend moving forward with carotid

## 2021-08-27 DIAGNOSIS — I25.10 CORONARY ARTERY DISEASE WITHOUT ANGINA PECTORIS, UNSPECIFIED VESSEL OR LESION TYPE, UNSPECIFIED WHETHER NATIVE OR TRANSPLANTED HEART: ICD-10-CM

## 2021-08-27 DIAGNOSIS — E11.9 TYPE 2 DIABETES MELLITUS WITHOUT COMPLICATION, WITHOUT LONG-TERM CURRENT USE OF INSULIN (HCC): ICD-10-CM

## 2021-08-27 RX ORDER — PROPYLENE GLYCOL/PEG 400 0.3 %-0.4%
DROPS OPHTHALMIC (EYE)
Qty: 90 TABLET | Refills: 5 | Status: SHIPPED | OUTPATIENT
Start: 2021-08-27 | End: 2022-04-07 | Stop reason: SDUPTHER

## 2021-08-27 RX ORDER — AMOXICILLIN 500 MG/1
500 CAPSULE ORAL 3 TIMES DAILY
Qty: 30 CAPSULE | Refills: 0 | Status: SHIPPED | OUTPATIENT
Start: 2021-08-27 | End: 2021-09-06

## 2021-08-27 NOTE — TELEPHONE ENCOUNTER
Medication:   Requested Prescriptions     Pending Prescriptions Disp Refills    [facility] ASPIRIN LOW STRENGTH 81 MG EC tablet [Pharmacy Med Name: [facility] ASPIRIN EC 81 MG TABLET] 90 tablet 5     Sig: TAKE 1 TABLET BY MOUTH EVERY DAY        Last Filled:      Patient Phone Number: [phone] (home)     Last appt: 7/29/2020   Next appt: Visit date not found    Last OARRS: No flowsheet data found.

## 2021-09-08 ENCOUNTER — TELEPHONE (OUTPATIENT)
Dept: VASCULAR SURGERY | Age: 61
End: 2021-09-08

## 2021-09-08 ENCOUNTER — HOSPITAL ENCOUNTER (OUTPATIENT)
Dept: CT IMAGING | Age: 61
Discharge: HOME | End: 2021-09-08
Payer: COMMERCIAL

## 2021-09-08 DIAGNOSIS — I65.23 CAROTID ATHEROSCLEROSIS, BILATERAL: Primary | ICD-10-CM

## 2021-09-08 DIAGNOSIS — I65.23 CAROTID ATHEROSCLEROSIS, BILATERAL: ICD-10-CM

## 2021-09-08 LAB
BUN BLDV-MCNC: 14 MG/DL (ref 7–20)
CREAT SERPL-MCNC: 0.9 MG/DL (ref 0.9–1.3)
GFR AFRICAN AMERICAN: >60
GFR NON-AFRICAN AMERICAN: >60

## 2021-09-08 PROCEDURE — 70498 CT ANGIOGRAPHY NECK: CPT

## 2021-09-08 PROCEDURE — 36415 COLL VENOUS BLD VENIPUNCTURE: CPT

## 2021-09-08 PROCEDURE — 6360000004 HC RX CONTRAST MEDICATION: Performed by: SURGERY

## 2021-09-08 PROCEDURE — 84520 ASSAY OF UREA NITROGEN: CPT

## 2021-09-08 PROCEDURE — 82565 ASSAY OF CREATININE: CPT

## 2021-09-08 RX ADMIN — IOPAMIDOL 75 ML: 755 INJECTION, SOLUTION INTRAVENOUS at 11:30

## 2021-09-13 ENCOUNTER — TELEPHONE (OUTPATIENT)
Dept: VASCULAR SURGERY | Age: 61
End: 2021-09-13

## 2021-09-13 NOTE — TELEPHONE ENCOUNTER
Discussed CT results with patient. Because of work-related issues he would like to wait till closer to December 13. We discussed risks benefits of waiting. He understands them. We will plan for right carotid endarterectomy at that time.

## 2021-09-14 ENCOUNTER — TELEPHONE (OUTPATIENT)
Dept: VASCULAR SURGERY | Age: 61
End: 2021-09-14

## 2021-09-14 ENCOUNTER — PREP FOR PROCEDURE (OUTPATIENT)
Dept: VASCULAR SURGERY | Age: 61
End: 2021-09-14

## 2021-09-14 DIAGNOSIS — I65.23 CAROTID ATHEROSCLEROSIS, BILATERAL: Primary | ICD-10-CM

## 2021-09-14 RX ORDER — SODIUM CHLORIDE 9 MG/ML
INJECTION, SOLUTION INTRAVENOUS CONTINUOUS
Status: CANCELLED | OUTPATIENT
Start: 2021-09-14

## 2021-09-14 RX ORDER — SODIUM CHLORIDE 9 MG/ML
25 INJECTION, SOLUTION INTRAVENOUS PRN
Status: CANCELLED | OUTPATIENT
Start: 2021-09-14

## 2021-09-14 RX ORDER — SODIUM CHLORIDE 0.9 % (FLUSH) 0.9 %
10 SYRINGE (ML) INJECTION EVERY 12 HOURS SCHEDULED
Status: CANCELLED | OUTPATIENT
Start: 2021-09-14

## 2021-09-14 RX ORDER — SODIUM CHLORIDE 0.9 % (FLUSH) 0.9 %
10 SYRINGE (ML) INJECTION PRN
Status: CANCELLED | OUTPATIENT
Start: 2021-09-14

## 2021-09-14 NOTE — TELEPHONE ENCOUNTER
----- Message from Pablo Roberson MD sent at 9/13/2021 12:29 PM EDT -----  I called him today and discussed the CT results. He will need a right carotid endarterectomy. He would like to wait until closer to December 13 for the surgery. I think that is fine.   Maybe we can plan for the Thursday prior which I think is December 9  Thank you  ----- Message -----  From: Tish Montes Incoming Radiology Results From [facility]  Sent: 9/8/2021   2:18 PM EDT  To: Pablo Roberson MD

## 2021-09-24 NOTE — PROGRESS NOTES
Name_______________________________________Printed:____________________  Date and time of surgery_10/14/2021_______0730________________Arrival Time:___0600  main_____________   1. The instructions given regarding when and if a patient needs to stop oral intake prior to surgery varies. Follow the specific instructions you were given                  _x__Nothing to eat or to drink after Midnight the night before.                   ____Carbo loading or ERAS instructions will be given to select patients-if you have been given those instructions -please do the following                           The evening before your surgery after dinner before midnight drink 40 ounces of gatorade. If you are diabetic use sugar free. The morning of surgery drink 40 ounces of water. This needs to be finished 3 hours prior to your surgery start time. 2. Take the following pills with a small sip of water on the morning of surgery_atenolol__________________________________________________                  Do not take blood pressure medications ending in pril or sartan the shahab prior to surgery or the morning of surgery_   3. Aspirin, Ibuprofen, Advil, Naproxen, Vitamin E and other Anti-inflammatory products and supplements should be stopped for 5 -7days before surgery or as directed by your physician. 4. Check with your Doctor regarding stopping Plavix, Coumadin,Eliquis, Lovenox,Effient,Pradaxa,Xarelto, Fragmin or other blood thinners and follow their instructions. 5. Do not smoke, and do not drink any alcoholic beverages 24 hours prior to surgery. This includes NA Beer. Refrain from the usage of any recreational drugs. 6. You may brush your teeth and gargle the morning of surgery. DO NOT SWALLOW WATER   7. You MUST make arrangements for a responsible adult to stay on site while you are here and take you home after your surgery. You will not be allowed to leave alone or drive yourself home.   It is strongly suggested someone stay with you the first 24 hrs. Your surgery will be cancelled if you do not have a ride home. 8. A parent/legal guardian must accompany a child scheduled for surgery and plan to stay at the hospital until the child is discharged. Please do not bring other children with you. 9. Please wear simple, loose fitting clothing to the hospital.  Nasreen Armstrong not bring valuables (money, credit cards, checkbooks, etc.) Do not wear any makeup (including no eye makeup) or nail polish on your fingers or toes. 10. DO NOT wear any jewelry or piercings on day of surgery. All body piercing jewelry must be removed. 11. If you have ___dentures, they will be removed before going to the OR; we will provide you a container. If you wear ___contact lenses or _x__glasses, they will be removed; please bring a case for them. 12. Please see your family doctor/pediatrician for a history & physical and/or concerning medications. Bring any test results/reports from your physician's office. PCP__________________Phone___________H&P Appt. Date________             13 If you  have a Living Will and Durable Power of  for Healthcare, please bring in a copy. 15. Notify your Surgeon if you develop any illness between now and surgery  time, cough, cold, fever, sore throat, nausea, vomiting, etc.  Please notify your surgeon if you experience dizziness, shortness of breath or blurred vision between now & the time of your surgery             15. DO NOT shave your operative site 96 hours prior to surgery. For face & neck surgery, men may use an electric razor 48 hours prior to surgery. 16. Shower the night before or morning of surgery using an antibacterial soap or as you have been instructed. 17. To provide excellent care visitors will be limited to one in the room at any given time. 18.  Please bring picture ID and insurance card.              19.  Visit our web site for additional information:  [email or web address]              20.During flu season no children under the age of 15 are permitted in the hospital for the safety of all patients. 21. If you take a long acting insulin in the evening only  take half of your usual  dose the night  before your procedure              22. If you use a c-pap please bring DOS if staying overnight,             23.For your convenience [facility] has a pharmacy on site to fill your prescriptions. 24. If you use oxygen and have a portable tank please bring it  with you the DOS             25. Bring a complete list of all your medications with name and dose include any supplements. 26. Other__________________________________________   *Please call pre admission testing if you any further questions   Kash Dan   [address]    [place]  [phone]   [facility] [address] DREW RANDOLPH   [phone]           COVID TESTING    _x__ Covid test to be done 3-5 days prior to scheduled surgery -patient aware they are REQUIRED to bring a copy of the negative result DOS-if they receive a positive result to notify their surgeon         If known - indicate where patient is getting covid test done ___having PAT 10/11/2021________________________________________________________    ___ Rapid - DOS    ___ Other___________________________________      [place] POLICY(subject to change)    There is a one visitor policy at [facility] for all surgeries and endoscopies. Whether the visitor can stay or will be asked to wait in the car will depend on the current policy and if social distancing can be maintained. The policy is subject to change at any time. Please make sure the visitor has a cell phone that is on,charged and able to accept calls, as this may be the way that the staff communicates with them. Pain management is NO VISITOR policyThe patients ride is expected to remain in the car with a cell phone for communication. If the ride is leaving the hospital grounds please make sure they are back in time for pickup. Have the patient inform the staff on arrival what their rides plans are while the patient is in the facility. At the MAIN there is one visitor allowed. Please note that the visitor policy is subject to change. All above information reviewed with patient in person or by phone. Patient verbalizes understanding. All questions and concerns addressed.                                                                                                  Patient/Rep__patient__________________                                                                                                                                    PRE OP INSTRUCTIONS

## 2021-09-24 NOTE — PROGRESS NOTES
Per Dr Jewels Aguirre' office, ok to do PAT 10/11/2021, continue plavix and ASA, do not take am of surgery. Patient notified.

## 2021-09-24 NOTE — PROGRESS NOTES
VM left for Piper Haas @ office re: patient out of town 10/2-10/10; will have PAT 10/11/2021; needs instructions on Plavix and asa.

## 2021-10-11 ENCOUNTER — HOSPITAL ENCOUNTER (OUTPATIENT)
Age: 61
Discharge: HOME | End: 2021-10-11
Payer: COMMERCIAL

## 2021-10-11 DIAGNOSIS — I65.23 CAROTID ATHEROSCLEROSIS, BILATERAL: ICD-10-CM

## 2021-10-11 LAB
ABO/RH: NORMAL
ANION GAP SERPL CALCULATED.3IONS-SCNC: 12 MMOL/L (ref 3–16)
ANTIBODY SCREEN: NORMAL
APTT: 31.9 SEC (ref 26.2–38.6)
BILIRUBIN URINE: NEGATIVE
BLOOD, URINE: NEGATIVE
BUN BLDV-MCNC: 15 MG/DL (ref 7–20)
CALCIUM SERPL-MCNC: 10 MG/DL (ref 8.3–10.6)
CHLORIDE BLD-SCNC: 99 MMOL/L (ref 99–110)
CLARITY: CLEAR
CO2: 26 MMOL/L (ref 21–32)
COLOR: YELLOW
CREAT SERPL-MCNC: 1.1 MG/DL (ref 0.9–1.3)
EPITHELIAL CELLS, UA: 0 /HPF (ref 0–5)
GFR AFRICAN AMERICAN: >60
GFR NON-AFRICAN AMERICAN: >60
GLUCOSE BLD-MCNC: 177 MG/DL (ref 70–99)
GLUCOSE URINE: NEGATIVE MG/DL
HCT VFR BLD CALC: 45.5 % (ref 40.5–52.5)
HEMOGLOBIN: 15.3 G/DL (ref 13.5–17.5)
HYALINE CASTS: 0 /LPF (ref 0–8)
INR BLD: 0.86 (ref 0.88–1.12)
KETONES, URINE: NEGATIVE MG/DL
LEUKOCYTE ESTERASE, URINE: NEGATIVE
MCH RBC QN AUTO: 31.1 PG (ref 26–34)
MCHC RBC AUTO-ENTMCNC: 33.6 G/DL (ref 31–36)
MCV RBC AUTO: 92.6 FL (ref 80–100)
MICROSCOPIC EXAMINATION: YES
NITRITE, URINE: NEGATIVE
PDW BLD-RTO: 13.9 % (ref 12.4–15.4)
PH UA: 6.5 (ref 5–8)
PLATELET # BLD: 239 K/UL (ref 135–450)
PMV BLD AUTO: 7.5 FL (ref 5–10.5)
POTASSIUM SERPL-SCNC: 5.3 MMOL/L (ref 3.5–5.1)
PROTEIN UA: ABNORMAL MG/DL
PROTHROMBIN TIME: 9.6 SEC (ref 9.9–12.7)
RBC # BLD: 4.92 M/UL (ref 4.2–5.9)
RBC UA: 2 /HPF (ref 0–4)
SODIUM BLD-SCNC: 137 MMOL/L (ref 136–145)
SPECIFIC GRAVITY UA: 1.01 (ref 1–1.03)
URINE TYPE: ABNORMAL
UROBILINOGEN, URINE: 0.2 E.U./DL
WBC # BLD: 8.8 K/UL (ref 4–11)
WBC UA: 0 /HPF (ref 0–5)

## 2021-10-11 PROCEDURE — 86901 BLOOD TYPING SEROLOGIC RH(D): CPT

## 2021-10-11 PROCEDURE — 86850 RBC ANTIBODY SCREEN: CPT

## 2021-10-11 PROCEDURE — 81001 URINALYSIS AUTO W/SCOPE: CPT

## 2021-10-11 PROCEDURE — 85027 COMPLETE CBC AUTOMATED: CPT

## 2021-10-11 PROCEDURE — U0005 INFEC AGEN DETEC AMPLI PROBE: HCPCS

## 2021-10-11 PROCEDURE — U0003 INFECTIOUS AGENT DETECTION BY NUCLEIC ACID (DNA OR RNA); SEVERE ACUTE RESPIRATORY SYNDROME CORONAVIRUS 2 (SARS-COV-2) (CORONAVIRUS DISEASE [COVID-19]), AMPLIFIED PROBE TECHNIQUE, MAKING USE OF HIGH THROUGHPUT TECHNOLOGIES AS DESCRIBED BY CMS-2020-01-R: HCPCS

## 2021-10-11 PROCEDURE — 80048 BASIC METABOLIC PNL TOTAL CA: CPT

## 2021-10-11 PROCEDURE — 86900 BLOOD TYPING SEROLOGIC ABO: CPT

## 2021-10-11 PROCEDURE — 85610 PROTHROMBIN TIME: CPT

## 2021-10-11 PROCEDURE — 36415 COLL VENOUS BLD VENIPUNCTURE: CPT

## 2021-10-11 PROCEDURE — 85730 THROMBOPLASTIN TIME PARTIAL: CPT

## 2021-10-12 LAB — SARS-COV-2: NOT DETECTED

## 2021-10-13 ENCOUNTER — ANESTHESIA EVENT (OUTPATIENT)
Dept: OPERATING ROOM | Age: 61
DRG: 039 | End: 2021-10-13
Payer: COMMERCIAL

## 2021-10-13 ENCOUNTER — TELEPHONE (OUTPATIENT)
Dept: VASCULAR SURGERY | Age: 61
End: 2021-10-13

## 2021-10-13 NOTE — TELEPHONE ENCOUNTER
----- Message from ANITA Pastor CNP sent at 10/13/2021  9:21 AM EDT -----  Regarding: RE: potassium  Yes please and tell him to limit potassium intake. Thanks.   ----- Message -----  From: Renetta Peterson MA  Sent: 10/13/2021   8:52 AM EDT  To: ANITA Pastor CNP  Subject: potassium                                        Potassium 5.3 - repeat day of surgery?

## 2021-10-13 NOTE — PROGRESS NOTES
VM left for [facility] @ Dr Morse ' office re K+. Reviewed with Dr Alexander Marroquin, repeat DOS.

## 2021-10-14 ENCOUNTER — ANESTHESIA (OUTPATIENT)
Dept: OPERATING ROOM | Age: 61
DRG: 039 | End: 2021-10-14
Payer: COMMERCIAL

## 2021-10-14 ENCOUNTER — HOSPITAL ENCOUNTER (INPATIENT)
Age: 61
LOS: 1 days | Discharge: HOME | DRG: 039 | End: 2021-10-15
Attending: SURGERY | Admitting: SURGERY
Payer: COMMERCIAL

## 2021-10-14 VITALS
RESPIRATION RATE: 5 BRPM | DIASTOLIC BLOOD PRESSURE: 61 MMHG | SYSTOLIC BLOOD PRESSURE: 129 MMHG | OXYGEN SATURATION: 91 % | TEMPERATURE: 96.4 F

## 2021-10-14 LAB
ABO/RH: NORMAL
ANION GAP SERPL CALCULATED.3IONS-SCNC: 11 MMOL/L (ref 3–16)
ANTIBODY SCREEN: NORMAL
BUN BLDV-MCNC: 18 MG/DL (ref 7–20)
CALCIUM SERPL-MCNC: 9.7 MG/DL (ref 8.3–10.6)
CHLORIDE BLD-SCNC: 105 MMOL/L (ref 99–110)
CO2: 23 MMOL/L (ref 21–32)
CREAT SERPL-MCNC: 1 MG/DL (ref 0.9–1.3)
GFR AFRICAN AMERICAN: >60
GFR NON-AFRICAN AMERICAN: >60
GLUCOSE BLD-MCNC: 167 MG/DL (ref 70–99)
GLUCOSE BLD-MCNC: 176 MG/DL (ref 70–99)
GLUCOSE BLD-MCNC: 180 MG/DL (ref 70–99)
PERFORMED ON: ABNORMAL
PERFORMED ON: ABNORMAL
POTASSIUM SERPL-SCNC: 4.3 MMOL/L (ref 3.5–5.1)
SODIUM BLD-SCNC: 139 MMOL/L (ref 136–145)

## 2021-10-14 PROCEDURE — 2720000010 HC SURG SUPPLY STERILE: Performed by: SURGERY

## 2021-10-14 PROCEDURE — 86900 BLOOD TYPING SEROLOGIC ABO: CPT

## 2021-10-14 PROCEDURE — C9248 INJ, CLEVIDIPINE BUTYRATE: HCPCS | Performed by: NURSE ANESTHETIST, CERTIFIED REGISTERED

## 2021-10-14 PROCEDURE — 2580000003 HC RX 258: Performed by: SURGERY

## 2021-10-14 PROCEDURE — 6360000002 HC RX W HCPCS: Performed by: SURGERY

## 2021-10-14 PROCEDURE — 2500000003 HC RX 250 WO HCPCS: Performed by: NURSE ANESTHETIST, CERTIFIED REGISTERED

## 2021-10-14 PROCEDURE — 2000000000 HC ICU R&B

## 2021-10-14 PROCEDURE — 80048 BASIC METABOLIC PNL TOTAL CA: CPT

## 2021-10-14 PROCEDURE — 7100000001 HC PACU RECOVERY - ADDTL 15 MIN: Performed by: SURGERY

## 2021-10-14 PROCEDURE — 2100000000 HC CCU R&B

## 2021-10-14 PROCEDURE — C1768 GRAFT, VASCULAR: HCPCS | Performed by: SURGERY

## 2021-10-14 PROCEDURE — 7100000000 HC PACU RECOVERY - FIRST 15 MIN: Performed by: SURGERY

## 2021-10-14 PROCEDURE — 6370000000 HC RX 637 (ALT 250 FOR IP): Performed by: SURGERY

## 2021-10-14 PROCEDURE — 2709999900 HC NON-CHARGEABLE SUPPLY: Performed by: SURGERY

## 2021-10-14 PROCEDURE — 3700000000 HC ANESTHESIA ATTENDED CARE: Performed by: SURGERY

## 2021-10-14 PROCEDURE — 6360000002 HC RX W HCPCS: Performed by: NURSE ANESTHETIST, CERTIFIED REGISTERED

## 2021-10-14 PROCEDURE — 35301 RECHANNELING OF ARTERY: CPT | Performed by: SURGERY

## 2021-10-14 PROCEDURE — 88304 TISSUE EXAM BY PATHOLOGIST: CPT

## 2021-10-14 PROCEDURE — 3600000014 HC SURGERY LEVEL 4 ADDTL 15MIN: Performed by: SURGERY

## 2021-10-14 PROCEDURE — 6370000000 HC RX 637 (ALT 250 FOR IP)

## 2021-10-14 PROCEDURE — 88311 DECALCIFY TISSUE: CPT

## 2021-10-14 PROCEDURE — 3700000001 HC ADD 15 MINUTES (ANESTHESIA): Performed by: SURGERY

## 2021-10-14 PROCEDURE — 6360000002 HC RX W HCPCS: Performed by: NURSE PRACTITIONER

## 2021-10-14 PROCEDURE — 36415 COLL VENOUS BLD VENIPUNCTURE: CPT

## 2021-10-14 PROCEDURE — 3600000004 HC SURGERY LEVEL 4 BASE: Performed by: SURGERY

## 2021-10-14 PROCEDURE — 86901 BLOOD TYPING SEROLOGIC RH(D): CPT

## 2021-10-14 PROCEDURE — 03CK0ZZ EXTIRPATION OF MATTER FROM RIGHT INTERNAL CAROTID ARTERY, OPEN APPROACH: ICD-10-PCS | Performed by: SURGERY

## 2021-10-14 PROCEDURE — 86850 RBC ANTIBODY SCREEN: CPT

## 2021-10-14 PROCEDURE — 2500000003 HC RX 250 WO HCPCS: Performed by: SURGERY

## 2021-10-14 PROCEDURE — 2580000003 HC RX 258: Performed by: NURSE ANESTHETIST, CERTIFIED REGISTERED

## 2021-10-14 PROCEDURE — 6370000000 HC RX 637 (ALT 250 FOR IP): Performed by: NURSE PRACTITIONER

## 2021-10-14 PROCEDURE — 6370000000 HC RX 637 (ALT 250 FOR IP): Performed by: NURSE ANESTHETIST, CERTIFIED REGISTERED

## 2021-10-14 DEVICE — PATCH VASC W0.8XL8CM PERIPH BOV PERICARD N PVC N DEHP CRSS: Type: IMPLANTABLE DEVICE | Site: CAROTID | Status: FUNCTIONAL

## 2021-10-14 RX ORDER — ONDANSETRON 2 MG/ML
INJECTION INTRAMUSCULAR; INTRAVENOUS PRN
Status: DISCONTINUED | OUTPATIENT
Start: 2021-10-14 | End: 2021-10-14 | Stop reason: SDUPTHER

## 2021-10-14 RX ORDER — EZETIMIBE 10 MG/1
1 TABLET ORAL DAILY
Status: DISCONTINUED | OUTPATIENT
Start: 2021-10-14 | End: 2021-10-14 | Stop reason: CLARIF

## 2021-10-14 RX ORDER — MORPHINE SULFATE 2 MG/ML
2 INJECTION, SOLUTION INTRAMUSCULAR; INTRAVENOUS
Status: DISCONTINUED | OUTPATIENT
Start: 2021-10-14 | End: 2021-10-15 | Stop reason: HOSPADM

## 2021-10-14 RX ORDER — ACETAMINOPHEN 325 MG/1
650 TABLET ORAL EVERY 4 HOURS PRN
Status: DISCONTINUED | OUTPATIENT
Start: 2021-10-14 | End: 2021-10-15 | Stop reason: HOSPADM

## 2021-10-14 RX ORDER — NICOTINE 21 MG/24HR
1 PATCH, TRANSDERMAL 24 HOURS TRANSDERMAL DAILY
Status: DISCONTINUED | OUTPATIENT
Start: 2021-10-14 | End: 2021-10-15 | Stop reason: HOSPADM

## 2021-10-14 RX ORDER — SODIUM CHLORIDE 0.9 % (FLUSH) 0.9 %
10 SYRINGE (ML) INJECTION PRN
Status: DISCONTINUED | OUTPATIENT
Start: 2021-10-14 | End: 2021-10-14 | Stop reason: HOSPADM

## 2021-10-14 RX ORDER — ONDANSETRON 2 MG/ML
4 INJECTION INTRAMUSCULAR; INTRAVENOUS
Status: DISCONTINUED | OUTPATIENT
Start: 2021-10-14 | End: 2021-10-14 | Stop reason: HOSPADM

## 2021-10-14 RX ORDER — SODIUM CHLORIDE, SODIUM LACTATE, POTASSIUM CHLORIDE, CALCIUM CHLORIDE 600; 310; 30; 20 MG/100ML; MG/100ML; MG/100ML; MG/100ML
INJECTION, SOLUTION INTRAVENOUS CONTINUOUS
Status: DISCONTINUED | OUTPATIENT
Start: 2021-10-14 | End: 2021-10-15

## 2021-10-14 RX ORDER — ESCITALOPRAM OXALATE 10 MG/1
20 TABLET ORAL DAILY
Status: DISCONTINUED | OUTPATIENT
Start: 2021-10-14 | End: 2021-10-15 | Stop reason: HOSPADM

## 2021-10-14 RX ORDER — ROSUVASTATIN CALCIUM 40 MG/1
40 TABLET, COATED ORAL DAILY
Status: DISCONTINUED | OUTPATIENT
Start: 2021-10-15 | End: 2021-10-15 | Stop reason: HOSPADM

## 2021-10-14 RX ORDER — SUCCINYLCHOLINE/SOD CL,ISO/PF 200MG/10ML
SYRINGE (ML) INTRAVENOUS PRN
Status: DISCONTINUED | OUTPATIENT
Start: 2021-10-14 | End: 2021-10-14 | Stop reason: SDUPTHER

## 2021-10-14 RX ORDER — DEXAMETHASONE SODIUM PHOSPHATE 4 MG/ML
INJECTION, SOLUTION INTRA-ARTICULAR; INTRALESIONAL; INTRAMUSCULAR; INTRAVENOUS; SOFT TISSUE PRN
Status: DISCONTINUED | OUTPATIENT
Start: 2021-10-14 | End: 2021-10-14 | Stop reason: SDUPTHER

## 2021-10-14 RX ORDER — LIDOCAINE HYDROCHLORIDE 20 MG/ML
INJECTION, SOLUTION EPIDURAL; INFILTRATION; INTRACAUDAL; PERINEURAL PRN
Status: DISCONTINUED | OUTPATIENT
Start: 2021-10-14 | End: 2021-10-14 | Stop reason: SDUPTHER

## 2021-10-14 RX ORDER — GLYCOPYRROLATE 1 MG/5 ML
SYRINGE (ML) INTRAVENOUS PRN
Status: DISCONTINUED | OUTPATIENT
Start: 2021-10-14 | End: 2021-10-14 | Stop reason: SDUPTHER

## 2021-10-14 RX ORDER — VECURONIUM BROMIDE 1 MG/ML
INJECTION, POWDER, LYOPHILIZED, FOR SOLUTION INTRAVENOUS PRN
Status: DISCONTINUED | OUTPATIENT
Start: 2021-10-14 | End: 2021-10-14 | Stop reason: SDUPTHER

## 2021-10-14 RX ORDER — LIDOCAINE HYDROCHLORIDE 40 MG/ML
SOLUTION TOPICAL PRN
Status: DISCONTINUED | OUTPATIENT
Start: 2021-10-14 | End: 2021-10-14 | Stop reason: SDUPTHER

## 2021-10-14 RX ORDER — MEPERIDINE HYDROCHLORIDE 25 MG/ML
12.5 INJECTION INTRAMUSCULAR; INTRAVENOUS; SUBCUTANEOUS EVERY 5 MIN PRN
Status: DISCONTINUED | OUTPATIENT
Start: 2021-10-14 | End: 2021-10-14 | Stop reason: HOSPADM

## 2021-10-14 RX ORDER — LABETALOL HYDROCHLORIDE 5 MG/ML
5 INJECTION, SOLUTION INTRAVENOUS EVERY 10 MIN PRN
Status: DISCONTINUED | OUTPATIENT
Start: 2021-10-14 | End: 2021-10-14 | Stop reason: HOSPADM

## 2021-10-14 RX ORDER — ONDANSETRON 2 MG/ML
4 INJECTION INTRAMUSCULAR; INTRAVENOUS EVERY 6 HOURS PRN
Status: DISCONTINUED | OUTPATIENT
Start: 2021-10-14 | End: 2021-10-15 | Stop reason: HOSPADM

## 2021-10-14 RX ORDER — OXYCODONE HYDROCHLORIDE 5 MG/1
5 TABLET ORAL EVERY 4 HOURS PRN
Status: DISCONTINUED | OUTPATIENT
Start: 2021-10-14 | End: 2021-10-15 | Stop reason: HOSPADM

## 2021-10-14 RX ORDER — SODIUM CHLORIDE 9 MG/ML
25 INJECTION, SOLUTION INTRAVENOUS PRN
Status: DISCONTINUED | OUTPATIENT
Start: 2021-10-14 | End: 2021-10-15 | Stop reason: HOSPADM

## 2021-10-14 RX ORDER — SODIUM CHLORIDE 0.9 % (FLUSH) 0.9 %
10 SYRINGE (ML) INJECTION EVERY 12 HOURS SCHEDULED
Status: DISCONTINUED | OUTPATIENT
Start: 2021-10-14 | End: 2021-10-14 | Stop reason: HOSPADM

## 2021-10-14 RX ORDER — OXYCODONE HYDROCHLORIDE AND ACETAMINOPHEN 5; 325 MG/1; MG/1
1 TABLET ORAL
Status: DISCONTINUED | OUTPATIENT
Start: 2021-10-14 | End: 2021-10-14 | Stop reason: HOSPADM

## 2021-10-14 RX ORDER — METFORMIN HYDROCHLORIDE 500 MG/1
1000 TABLET, EXTENDED RELEASE ORAL 2 TIMES DAILY WITH MEALS
Status: DISCONTINUED | OUTPATIENT
Start: 2021-10-15 | End: 2021-10-15 | Stop reason: HOSPADM

## 2021-10-14 RX ORDER — ATORVASTATIN CALCIUM 20 MG/1
20 TABLET, FILM COATED ORAL NIGHTLY
Status: DISCONTINUED | OUTPATIENT
Start: 2021-10-15 | End: 2021-10-14 | Stop reason: ALTCHOICE

## 2021-10-14 RX ORDER — HYDROMORPHONE HCL 110MG/55ML
0.5 PATIENT CONTROLLED ANALGESIA SYRINGE INTRAVENOUS EVERY 5 MIN PRN
Status: DISCONTINUED | OUTPATIENT
Start: 2021-10-14 | End: 2021-10-14 | Stop reason: HOSPADM

## 2021-10-14 RX ORDER — HYDRALAZINE HYDROCHLORIDE 20 MG/ML
5 INJECTION INTRAMUSCULAR; INTRAVENOUS EVERY 10 MIN PRN
Status: DISCONTINUED | OUTPATIENT
Start: 2021-10-14 | End: 2021-10-14 | Stop reason: HOSPADM

## 2021-10-14 RX ORDER — SODIUM CHLORIDE 0.9 % (FLUSH) 0.9 %
5-40 SYRINGE (ML) INJECTION EVERY 12 HOURS SCHEDULED
Status: DISCONTINUED | OUTPATIENT
Start: 2021-10-14 | End: 2021-10-15 | Stop reason: HOSPADM

## 2021-10-14 RX ORDER — PROPOFOL 10 MG/ML
INJECTION, EMULSION INTRAVENOUS PRN
Status: DISCONTINUED | OUTPATIENT
Start: 2021-10-14 | End: 2021-10-14 | Stop reason: SDUPTHER

## 2021-10-14 RX ORDER — SODIUM CHLORIDE 9 MG/ML
INJECTION, SOLUTION INTRAVENOUS CONTINUOUS PRN
Status: DISCONTINUED | OUTPATIENT
Start: 2021-10-14 | End: 2021-10-14 | Stop reason: SDUPTHER

## 2021-10-14 RX ORDER — M-VIT,TX,IRON,MINS/CALC/FOLIC 27MG-0.4MG
1 TABLET ORAL DAILY
COMMUNITY

## 2021-10-14 RX ORDER — PHENYLEPHRINE HCL IN 0.9% NACL 1 MG/10 ML
SYRINGE (ML) INTRAVENOUS PRN
Status: DISCONTINUED | OUTPATIENT
Start: 2021-10-14 | End: 2021-10-14 | Stop reason: SDUPTHER

## 2021-10-14 RX ORDER — SODIUM CHLORIDE 9 MG/ML
INJECTION, SOLUTION INTRAVENOUS CONTINUOUS
Status: DISCONTINUED | OUTPATIENT
Start: 2021-10-14 | End: 2021-10-14

## 2021-10-14 RX ORDER — ONDANSETRON 4 MG/1
4 TABLET, ORALLY DISINTEGRATING ORAL EVERY 8 HOURS PRN
Status: DISCONTINUED | OUTPATIENT
Start: 2021-10-14 | End: 2021-10-15 | Stop reason: HOSPADM

## 2021-10-14 RX ORDER — KETAMINE HCL IN NACL, ISO-OSM 100MG/10ML
SYRINGE (ML) INJECTION PRN
Status: DISCONTINUED | OUTPATIENT
Start: 2021-10-14 | End: 2021-10-14 | Stop reason: SDUPTHER

## 2021-10-14 RX ORDER — SODIUM CHLORIDE 0.9 % (FLUSH) 0.9 %
5-40 SYRINGE (ML) INJECTION PRN
Status: DISCONTINUED | OUTPATIENT
Start: 2021-10-14 | End: 2021-10-15 | Stop reason: HOSPADM

## 2021-10-14 RX ORDER — ATENOLOL 50 MG/1
50 TABLET ORAL DAILY
Status: DISCONTINUED | OUTPATIENT
Start: 2021-10-15 | End: 2021-10-15 | Stop reason: HOSPADM

## 2021-10-14 RX ORDER — HEPARIN SODIUM 1000 [USP'U]/ML
INJECTION, SOLUTION INTRAVENOUS; SUBCUTANEOUS PRN
Status: DISCONTINUED | OUTPATIENT
Start: 2021-10-14 | End: 2021-10-14 | Stop reason: SDUPTHER

## 2021-10-14 RX ORDER — ALPRAZOLAM 0.5 MG/1
1 TABLET ORAL NIGHTLY PRN
Status: DISCONTINUED | OUTPATIENT
Start: 2021-10-14 | End: 2021-10-15 | Stop reason: HOSPADM

## 2021-10-14 RX ORDER — MAGNESIUM SULFATE HEPTAHYDRATE 500 MG/ML
INJECTION, SOLUTION INTRAMUSCULAR; INTRAVENOUS PRN
Status: DISCONTINUED | OUTPATIENT
Start: 2021-10-14 | End: 2021-10-14 | Stop reason: SDUPTHER

## 2021-10-14 RX ORDER — FENTANYL CITRATE 50 UG/ML
INJECTION, SOLUTION INTRAMUSCULAR; INTRAVENOUS PRN
Status: DISCONTINUED | OUTPATIENT
Start: 2021-10-14 | End: 2021-10-14 | Stop reason: SDUPTHER

## 2021-10-14 RX ORDER — CLOPIDOGREL BISULFATE 75 MG/1
75 TABLET ORAL DAILY
Status: DISCONTINUED | OUTPATIENT
Start: 2021-10-15 | End: 2021-10-15 | Stop reason: HOSPADM

## 2021-10-14 RX ORDER — ALPRAZOLAM 1 MG/1
1 TABLET ORAL NIGHTLY PRN
COMMUNITY
End: 2022-04-07 | Stop reason: SDUPTHER

## 2021-10-14 RX ORDER — ASPIRIN 81 MG/1
81 TABLET ORAL DAILY
Status: DISCONTINUED | OUTPATIENT
Start: 2021-10-14 | End: 2021-10-15 | Stop reason: HOSPADM

## 2021-10-14 RX ORDER — PROTAMINE SULFATE 10 MG/ML
INJECTION, SOLUTION INTRAVENOUS PRN
Status: DISCONTINUED | OUTPATIENT
Start: 2021-10-14 | End: 2021-10-14 | Stop reason: SDUPTHER

## 2021-10-14 RX ORDER — LIDOCAINE HYDROCHLORIDE 10 MG/ML
INJECTION, SOLUTION INFILTRATION; PERINEURAL
Status: COMPLETED | OUTPATIENT
Start: 2021-10-14 | End: 2021-10-14

## 2021-10-14 RX ORDER — SODIUM CHLORIDE 9 MG/ML
25 INJECTION, SOLUTION INTRAVENOUS PRN
Status: DISCONTINUED | OUTPATIENT
Start: 2021-10-14 | End: 2021-10-14 | Stop reason: HOSPADM

## 2021-10-14 RX ORDER — MIDAZOLAM HYDROCHLORIDE 1 MG/ML
INJECTION INTRAMUSCULAR; INTRAVENOUS PRN
Status: DISCONTINUED | OUTPATIENT
Start: 2021-10-14 | End: 2021-10-14 | Stop reason: SDUPTHER

## 2021-10-14 RX ADMIN — FENTANYL CITRATE 50 MCG: 50 INJECTION, SOLUTION INTRAMUSCULAR; INTRAVENOUS at 07:38

## 2021-10-14 RX ADMIN — VECURONIUM BROMIDE 10 MG: 1 INJECTION, POWDER, LYOPHILIZED, FOR SOLUTION INTRAVENOUS at 07:45

## 2021-10-14 RX ADMIN — ESCITALOPRAM OXALATE 20 MG: 10 TABLET ORAL at 12:23

## 2021-10-14 RX ADMIN — PROPOFOL 20 MG: 10 INJECTION, EMULSION INTRAVENOUS at 07:15

## 2021-10-14 RX ADMIN — CEFAZOLIN 2000 MG: 10 INJECTION, POWDER, FOR SOLUTION INTRAVENOUS at 23:09

## 2021-10-14 RX ADMIN — FENTANYL CITRATE 50 MCG: 50 INJECTION, SOLUTION INTRAMUSCULAR; INTRAVENOUS at 09:14

## 2021-10-14 RX ADMIN — LIDOCAINE HYDROCHLORIDE 4 ML: 40 SOLUTION TOPICAL at 09:14

## 2021-10-14 RX ADMIN — Medication 10 MG: at 08:37

## 2021-10-14 RX ADMIN — DEXAMETHASONE SODIUM PHOSPHATE 4 MG: 4 INJECTION, SOLUTION INTRAMUSCULAR; INTRAVENOUS at 07:45

## 2021-10-14 RX ADMIN — LIDOCAINE HYDROCHLORIDE 100 MG: 20 INJECTION, SOLUTION EPIDURAL; INFILTRATION; INTRACAUDAL; PERINEURAL at 07:38

## 2021-10-14 RX ADMIN — PROPOFOL 20 MG: 10 INJECTION, EMULSION INTRAVENOUS at 07:17

## 2021-10-14 RX ADMIN — ASPIRIN 81 MG: 81 TABLET, COATED ORAL at 12:23

## 2021-10-14 RX ADMIN — MAGNESIUM SULFATE HEPTAHYDRATE 1 G: 500 INJECTION, SOLUTION INTRAMUSCULAR; INTRAVENOUS at 07:45

## 2021-10-14 RX ADMIN — CEFAZOLIN 2000 MG: 10 INJECTION, POWDER, FOR SOLUTION INTRAVENOUS at 07:27

## 2021-10-14 RX ADMIN — SODIUM CHLORIDE: 9 INJECTION, SOLUTION INTRAVENOUS at 07:29

## 2021-10-14 RX ADMIN — PROPOFOL 20 MG: 10 INJECTION, EMULSION INTRAVENOUS at 07:19

## 2021-10-14 RX ADMIN — Medication 200 MCG: at 07:50

## 2021-10-14 RX ADMIN — CEFAZOLIN 2000 MG: 10 INJECTION, POWDER, FOR SOLUTION INTRAVENOUS at 16:40

## 2021-10-14 RX ADMIN — Medication 10 MG: at 08:52

## 2021-10-14 RX ADMIN — Medication 10 ML: at 19:55

## 2021-10-14 RX ADMIN — Medication 0.2 MG: at 07:45

## 2021-10-14 RX ADMIN — SODIUM CHLORIDE, POTASSIUM CHLORIDE, SODIUM LACTATE AND CALCIUM CHLORIDE: 600; 310; 30; 20 INJECTION, SOLUTION INTRAVENOUS at 12:19

## 2021-10-14 RX ADMIN — OXYCODONE 5 MG: 5 TABLET ORAL at 12:09

## 2021-10-14 RX ADMIN — Medication 100 MCG: at 08:16

## 2021-10-14 RX ADMIN — HEPARIN SODIUM 5000 UNITS: 1000 INJECTION INTRAVENOUS; SUBCUTANEOUS at 08:10

## 2021-10-14 RX ADMIN — BENZOCAINE AND MENTHOL 1 LOZENGE: 15; 3.6 LOZENGE ORAL at 17:34

## 2021-10-14 RX ADMIN — PROTAMINE SULFATE 30 MG: 10 INJECTION, SOLUTION INTRAVENOUS at 08:46

## 2021-10-14 RX ADMIN — Medication 170 MG: at 07:38

## 2021-10-14 RX ADMIN — ONDANSETRON 4 MG: 2 INJECTION INTRAMUSCULAR; INTRAVENOUS at 07:45

## 2021-10-14 RX ADMIN — ACETAMINOPHEN 650 MG: 325 TABLET ORAL at 17:34

## 2021-10-14 RX ADMIN — PROTAMINE SULFATE 20 MG: 10 INJECTION, SOLUTION INTRAVENOUS at 08:51

## 2021-10-14 RX ADMIN — CLEVIPIDINE 0.5 MG/HR: 0.5 EMULSION INTRAVENOUS at 08:07

## 2021-10-14 RX ADMIN — Medication 200 MCG: at 07:45

## 2021-10-14 RX ADMIN — MIDAZOLAM 2 MG: 1 INJECTION INTRAMUSCULAR; INTRAVENOUS at 07:21

## 2021-10-14 RX ADMIN — Medication 100 MCG: at 08:03

## 2021-10-14 RX ADMIN — Medication 10 MG: at 08:10

## 2021-10-14 RX ADMIN — PROPOFOL 200 MG: 10 INJECTION, EMULSION INTRAVENOUS at 07:38

## 2021-10-14 RX ADMIN — PROPOFOL 50 MG: 10 INJECTION, EMULSION INTRAVENOUS at 07:55

## 2021-10-14 ASSESSMENT — PULMONARY FUNCTION TESTS
PIF_VALUE: 1
PIF_VALUE: 14
PIF_VALUE: 20
PIF_VALUE: 21
PIF_VALUE: 20
PIF_VALUE: 21
PIF_VALUE: 17
PIF_VALUE: 2
PIF_VALUE: 20
PIF_VALUE: 20
PIF_VALUE: 18
PIF_VALUE: 18
PIF_VALUE: 20
PIF_VALUE: 20
PIF_VALUE: 0
PIF_VALUE: 2
PIF_VALUE: 18
PIF_VALUE: 1
PIF_VALUE: 20
PIF_VALUE: 0
PIF_VALUE: 1
PIF_VALUE: 20
PIF_VALUE: 21
PIF_VALUE: 20
PIF_VALUE: 18
PIF_VALUE: 15
PIF_VALUE: 19
PIF_VALUE: 20
PIF_VALUE: 20
PIF_VALUE: 21
PIF_VALUE: 15
PIF_VALUE: 17
PIF_VALUE: 17
PIF_VALUE: 16
PIF_VALUE: 20
PIF_VALUE: 6
PIF_VALUE: 33
PIF_VALUE: 1
PIF_VALUE: 17
PIF_VALUE: 20
PIF_VALUE: 20
PIF_VALUE: 19
PIF_VALUE: 18
PIF_VALUE: 1
PIF_VALUE: 18
PIF_VALUE: 21
PIF_VALUE: 19
PIF_VALUE: 18
PIF_VALUE: 20
PIF_VALUE: 15
PIF_VALUE: 20
PIF_VALUE: 6
PIF_VALUE: 19
PIF_VALUE: 20
PIF_VALUE: 21
PIF_VALUE: 21
PIF_VALUE: 17
PIF_VALUE: 19
PIF_VALUE: 17
PIF_VALUE: 20
PIF_VALUE: 20
PIF_VALUE: 21
PIF_VALUE: 20
PIF_VALUE: 17
PIF_VALUE: 21
PIF_VALUE: 21
PIF_VALUE: 15
PIF_VALUE: 20
PIF_VALUE: 17
PIF_VALUE: 7
PIF_VALUE: 19
PIF_VALUE: 19
PIF_VALUE: 20
PIF_VALUE: 20
PIF_VALUE: 21
PIF_VALUE: 19
PIF_VALUE: 19
PIF_VALUE: 21
PIF_VALUE: 20
PIF_VALUE: 1
PIF_VALUE: 20
PIF_VALUE: 1
PIF_VALUE: 21
PIF_VALUE: 20
PIF_VALUE: 15
PIF_VALUE: 24
PIF_VALUE: 21
PIF_VALUE: 20
PIF_VALUE: 21
PIF_VALUE: 4
PIF_VALUE: 18
PIF_VALUE: 20
PIF_VALUE: 1
PIF_VALUE: 20
PIF_VALUE: 1
PIF_VALUE: 21
PIF_VALUE: 20
PIF_VALUE: 20
PIF_VALUE: 0
PIF_VALUE: 17
PIF_VALUE: 21
PIF_VALUE: 20
PIF_VALUE: 2
PIF_VALUE: 21
PIF_VALUE: 20
PIF_VALUE: 15
PIF_VALUE: 20
PIF_VALUE: 20
PIF_VALUE: 17
PIF_VALUE: 1
PIF_VALUE: 20
PIF_VALUE: 19

## 2021-10-14 ASSESSMENT — PAIN SCALES - GENERAL
PAINLEVEL_OUTOF10: 0
PAINLEVEL_OUTOF10: 3
PAINLEVEL_OUTOF10: 0
PAINLEVEL_OUTOF10: 6
PAINLEVEL_OUTOF10: 0

## 2021-10-14 NOTE — H&P
doses. If no relief after 1 dose, call 911. 7/29/20   Karina Lawler MD        [facility] Meds:    Current Facility-Administered Medications   Medication Dose Route Frequency Provider Last Rate Last Admin    0.9 % sodium chloride infusion   IntraVENous Continuous ANITA Gonzalez - CNP        0.9 % sodium chloride infusion  25 mL IntraVENous PRN Citlaly Jeff APRN - CNP        ceFAZolin (ANCEF) 2000 mg in dextrose 5 % 50 mL IVPB  2,000 mg IntraVENous On Call to [address] [place] [address], APRN - CNP        sodium chloride flush 0.9 % injection 10 mL  10 mL IntraVENous 2 times per day ANITA Gonzalez - CNP        sodium chloride flush 0.9 % injection 10 mL  10 mL IntraVENous PRN ANITA Gonzalez - CNP           Social History:       Social History     Socioeconomic History    Marital status:      Spouse name: None    Number of children: None    Years of education: None    Highest education level: None   Occupational History    None   Tobacco Use    Smoking status: Current Every Day Smoker     Packs/day: 0.50     Years: 20.00     Pack years: 10.00     Types: Cigarettes    Smokeless tobacco: Never Used   Vaping Use    Vaping Use: Some days   Substance and Sexual Activity    Alcohol use: Yes     Alcohol/week: 4.0 standard drinks     Types: 4 Shots of liquor per week     Comment:  per month    Drug use: No    Sexual activity: Yes     Partners: Female   Other Topics Concern    None   Social History Narrative    None     Social Determinants of Health     Financial Resource Strain:     Difficulty of Paying Living Expenses:    Food Insecurity:     Worried About Running Out of Food in the Last Year:     Ran Out of Food in the Last Year:    Transportation Needs:     Lack of Transportation (Medical):      Lack of Transportation (Non-Medical):    Physical Activity:     Days of Exercise per Week:     Minutes of Exercise per Session:    Stress:     Feeling of Stress :    Social Connections:     Frequency of Communication with Friends and Family:     Frequency of Social Gatherings with Friends and Family:     Attends Yazdanism Services:     Active Member of Clubs or Organizations:     Attends Club or Organization Meetings:     Marital Status:    Intimate Partner Violence:     Fear of Current or Ex-Partner:     Emotionally Abused:     Physically Abused:     Sexually Abused:        Family Histroy:      Family History   Problem Relation Age of Onset    Heart Disease Father     High Blood Pressure Father     High Cholesterol Father     High Blood Pressure Mother        Review of Systems:  Review of systems performed and negative with the exception of the above findings        Physical Exam   Vital Signs: BP (!) 161/81   Pulse 71   Temp 98.1 °F (36.7 °C) (Temporal)   Resp 20   SpO2 98%        Admission       General appearance: alert, appears stated age, cooperative and no distress  Head: Normocephalic, without obvious abnormality, atraumatic  Lungs: clear to auscultation bilaterally  Heart: regular rate and rhythm, S1, S2 normal, no murmur, click, rub or gallop  Abdomen: soft, non-tender.  Bowel sounds normal. No masses,  no organomegaly  Extremities: extremities normal, atraumatic, no cyanosis or edema  Pulses:      Labs:    BMP:   Lab Results   Component Value Date     10/14/2021    K 4.3 10/14/2021     10/14/2021    CO2 23 10/14/2021    BUN 18 10/14/2021    CREATININE 1.0 10/14/2021      No components found for: GLUNo results found for: MG   CBC:   Lab Results   Component Value Date    WBC 8.8 10/11/2021    HGB 15.3 10/11/2021    HCT 45.5 10/11/2021    MCV 92.6 10/11/2021     10/11/2021      Coagulation:   Lab Results   Component Value Date    INR 0.86 10/11/2021    APTT 31.9 10/11/2021     Cardiac markers:   Lab Results   Component Value Date    TROPONINI 0.15 07/21/2020     Lab Results   Component Value Date    LABA1C 7.5 07/21/2020    No results found for: ALB    Lab Results   Component Value Date    BILITOT 0.4 01/09/2020    AST 13 01/09/2020    ALT 13 01/09/2020    ALKPHOS 69 01/09/2020      Lab Results   Component Value Date    CHOL 135 07/21/2020    HDL 33 07/21/2020    HDL 41 02/28/2012    LDLCALC 59 07/21/2020    TRIG 214 07/21/2020          Diagnosis:  Patient Active Problem List   Diagnosis    CAD (coronary artery disease)    HTN (hypertension)    [facility]    Nonspecific granulomatous    Anxiety    HLD (hyperlipidemia)    Coronary stent    Carotid artery disease [facility]    Erectile dysfunction    Smoker    Subclavian vein stenosis, left    Cardiac chest pain    CAD S/P percutaneous coronary angioplasty    NSTEMI (non-ST elevated myocardial infarction) ([facility]           Plan:  Right CEA        Tomas Odell M.D., FACS.   10/14/2021  7:14 AM

## 2021-10-14 NOTE — ANESTHESIA POSTPROCEDURE EVALUATION
Department of Anesthesiology  Postprocedure Note    Patient: Charles Hernandez  MRN: [id number]  YOB: 1960  Date of evaluation: 10/14/2021  Time:  9:42 AM     Procedure Summary     Date: 10/14/21 Room / Location: [address]    Anesthesia Start: 1207 Anesthesia Stop: 2005    Procedure: RIGHT CAROTID ENDARTERECTOMY (Right ) Diagnosis: (I65.23 RIGHT CAROTID STENOSIS)    Surgeons: Ashly Morales MD Responsible Provider: Derek Bañuelos MD    Anesthesia Type: general ASA Status: 3          Anesthesia Type: general    Gregorio Phase I: Gregorio Score: 10    Gregorio Phase II:      Last vitals: Reviewed and per EMR flowsheets.        Anesthesia Post Evaluation    Patient location during evaluation: PACU  Patient participation: complete - patient participated  Level of consciousness: awake  Airway patency: patent  Nausea & Vomiting: no vomiting and no nausea  Complications: no  Cardiovascular status: hemodynamically stable  Respiratory status: acceptable  Hydration status: stable  Multimodal analgesia pain management approach

## 2021-10-14 NOTE — PROGRESS NOTES
Patient admitted to CVU from PACU and attached to CVU monitors. Report received from PACU RN. Hemodynamics stable and will continue to monitor. Right neck incision soft, not oozing, and dry sterile dressing on site. Neuro checks completed and within normal limits. Patient oriented to room and call light within reach. Family let back to see patient. Visiting hours reviewed and all questions answered.

## 2021-10-14 NOTE — DISCHARGE SUMMARY
DISCHARGE SUMMARY    Admission Date:  10/14/2021  6:09 AM  Discharge Date:  10/15/21    Principle Diagnosis: carotid stenosis    Secondary Diagnosis:  Active Problems:    Carotid atherosclerosis, right  Resolved Problems:    * No resolved hospital problems. *    Patient Active Problem List   Diagnosis    CAD (coronary artery disease)    HTN (hypertension)    [facility]    Nonspecific granulomatous    Anxiety    HLD (hyperlipidemia)    Coronary stent    Carotid artery disease ([facility]    Erectile dysfunction    Smoker    Subclavian vein stenosis, left    Cardiac chest pain    CAD S/P percutaneous coronary angioplasty    NSTEMI (non-ST elevated myocardial infarction) ([facility]    Carotid atherosclerosis, right        Procedure:    RIGHT CAROTID ENDARTERECTOMY     Consults:  None    History:  The patient is a 64 y.o. male with history of CAD, PVD, carotid stenosis, HTN, HLD, DM, anxiety and tobacco use. Hospital Course: The patient underwent right carotid endarterectomy on 10/14/2021  6:09 AM.  Their post-operative course was uncomplicated. Pain was controlled with combination of oral and IV medications. They experienced no neurologic changes from baseline following surgery. They were able to ambulate without difficulty and tolerate a regular diet. Continue ASA 81 mg daily, Plavix 75 mg daily and statin therapy. The patient was discharged on 10/15/21. Disposition:  home in stable condition. Patient Instructions:   Mikala Young   Home Medication Instructions CIY:[id number]    Printed on:10/15/21 6382   Medication Information                      ALPRAZolam (XANAX) 1 MG tablet  Take 1 mg by mouth nightly as needed for Sleep.              atenolol (TENORMIN) 50 MG tablet  TAKE 1 TABLET BY MOUTH EVERY DAY             clopidogrel (PLAVIX) 75 MG tablet  TAKE 1 TABLET BY MOUTH EVERY DAY             [employer] ASPIRIN LOW STRENGTH 81 MG EC tablet  TAKE 1 TABLET BY MOUTH EVERY DAY             escitalopram (LEXAPRO) 10 MG tablet  TAKE 1 TABLET BY MOUTH EVERY DAY             escitalopram (LEXAPRO) 20 MG tablet  TAKE 1 TABLET BY MOUTH EVERY DAY             ezetimibe (ZETIA) 10 MG tablet  TAKE 1 TABLET BY MOUTH EVERY DAY             metFORMIN (GLUCOPHAGE-XR) 500 MG extended release tablet  TAKE 2 TABLETS BY MOUTH TWICE A DAY             Multiple Vitamins-Minerals (THERAPEUTIC MULTIVITAMIN-MINERALS) tablet  Take 1 tablet by mouth daily             nitroGLYCERIN (NITROSTAT) 0.4 MG SL tablet  Place 1 tablet under the tongue every 5 minutes as needed for Chest pain up to max of 3 total doses. If no relief after 1 dose, call 911. rosuvastatin (CRESTOR) 40 MG tablet  TAKE 1 TABLET BY MOUTH EVERY DAY               Activity:  No heavy lifting, driving, or strenuous exercise for 2 weeks. No working for 2 weeks. Diet:  regular diet  Wound Care:  keep incision clean and dry. Can use soap and water to clean, keep open to air. Okay to shower on Saturday. No tub baths, swimming or hot tubs until seen in the office. Apply ice as needed to incision. Work on quitting smoking. Follow up with Dr. Arlen Chaudhary on 11/2/21 at 8:15 am.  Please call the office at [phone] with questions or concerns.       Electronically signed by ANITA Au CNP on 10/15/2021 at 8:21 AM

## 2021-10-14 NOTE — CARE COORDINATION
Patient admitted  with an anticipated short hospitalization length of stay. Chart reviewed and it appears that patient has minimal needs for discharge at this time. Discussed with patients nurse and requested that case management be notified if discharge needs are identified. *Case management will continue to follow progress and update discharge plan as needed.     Charu Gomez, JUNAIDN, CCM, RN  [facility]  [phone]

## 2021-10-14 NOTE — PROGRESS NOTES
Order received for arterial line removal.  Left radial arterial line discontinued. Manual pressure held for 20 minutes and tegaderm on site. No hematoma, no oozing noted. Patient tolerated well.

## 2021-10-14 NOTE — ANESTHESIA PRE PROCEDURE
Richrd Band, APRN - CNP        0.9 % sodium chloride infusion  25 mL IntraVENous PRN Richrd Band, APRN - CNP        ceFAZolin (ANCEF) 2000 mg in dextrose 5 % 50 mL IVPB  2,000 mg IntraVENous On Call to [address] [place] [address], APRN - CNP        sodium chloride flush 0.9 % injection 10 mL  10 mL IntraVENous 2 times per day Richrd Band, APRN - CNP        sodium chloride flush 0.9 % injection 10 mL  10 mL IntraVENous PRN Richrd Band, APRN - CNP           Allergies:  No Known Allergies    Problem List:    Patient Active Problem List   Diagnosis Code    CAD (coronary artery disease) I25.10    HTN (hypertension) I10    [facility]VD [facility]) ([facility] I73.9    Nonspecific granulomatous     Anxiety F41.9    HLD (hyperlipidemia) E78.5    Coronary stent     Carotid artery disease ([facility] I77.9    Erectile dysfunction N52.9    Smoker F17.200    Subclavian vein stenosis, left I87.1    Cardiac chest pain R07.9    CAD S/P percutaneous coronary angioplasty I25.10, Z98.61    NSTEMI (non-ST elevated myocardial infarction) ([facility] I21.4       Past Medical History:        Diagnosis Date    Anxiety     CAD (coronary artery disease)     Diabetes mellitus ([facility]     Hyperlipidemia     Hypertension     PVD (peripheral vascular disease) ([facility]        Past Surgical History:        Procedure Laterality Date    CORONARY ANGIOPLASTY         Social History:    Social History     Tobacco Use    Smoking status: Current Every Day Smoker     Packs/day: 0.50     Years: 20.00     Pack years: 10.00     Types: Cigarettes    Smokeless tobacco: Never Used   Substance Use Topics    Alcohol use:  Yes     Alcohol/week: 4.0 standard drinks     Types: 4 Shots of liquor per week     Comment:  per month                                Ready to quit: Not Answered  Counseling given: Not Answered      Vital Signs (Current):   Vitals:    10/14/21 0641   BP: (!) 161/81   Pulse: 71   Resp: 20   Temp: 98.1 °F (36.7 °C)   TempSrc: Temporal SpO2: 98%                                              BP Readings from Last 3 Encounters:   10/14/21 (!) 161/81   08/17/21 128/80   08/12/20 118/78       NPO Status: Time of last liquid consumption: 0500                        Time of last solid consumption: 1900                        Date of last liquid consumption: 10/14/21                        Date of last solid food consumption: 10/13/21    BMI:   Wt Readings from Last 3 Encounters:   08/17/21 191 lb (86.6 kg)   08/12/20 187 lb 12.8 oz (85.2 kg)   07/21/20 180 lb 8.9 oz (81.9 kg)     There is no height or weight on file to calculate BMI.    CBC:   Lab Results   Component Value Date    WBC 8.8 10/11/2021    RBC 4.92 10/11/2021    HGB 15.3 10/11/2021    HCT 45.5 10/11/2021    MCV 92.6 10/11/2021    RDW 13.9 10/11/2021     10/11/2021       CMP:   Lab Results   Component Value Date     10/14/2021    K 4.3 10/14/2021     10/14/2021    CO2 23 10/14/2021    BUN 18 10/14/2021    CREATININE 1.0 10/14/2021    GFRAA >60 10/14/2021    GFRAA >60 02/05/2013    AGRATIO 1.7 01/09/2020    LABGLOM >60 10/14/2021    GLUCOSE 180 10/14/2021    PROT 7.2 01/09/2020    PROT 7.4 02/05/2013    CALCIUM 9.7 10/14/2021    BILITOT 0.4 01/09/2020    ALKPHOS 69 01/09/2020    AST 13 01/09/2020    ALT 13 01/09/2020       POC Tests:   Recent Labs     10/14/21  0646   POCGLU 167*       Coags:   Lab Results   Component Value Date    PROTIME 9.6 10/11/2021    INR 0.86 10/11/2021    APTT 31.9 10/11/2021       HCG (If Applicable): No results found for: PREGTESTUR, PREGSERUM, HCG, HCGQUANT     ABGs: No results found for: PHART, PO2ART, FMO6MBK, NLY1FVK, BEART, D4KNIMJO     Type & Screen (If Applicable):  No results found for: LABABO, LABRH    Drug/Infectious Status (If Applicable):  No results found for: HIV, HEPCAB    COVID-19 Screening (If Applicable):   Lab Results   Component Value Date    COVID19 Not Detected 10/11/2021           Anesthesia Evaluation  Patient summary reviewed and Nursing notes reviewed  Airway: Mallampati: II        Dental:          Pulmonary:                              Cardiovascular:  Exercise tolerance: poor (<4 METS),   (+) hypertension:, past MI:, CAD:,       ECG reviewed      Echocardiogram reviewed    Cleared by cardiology           ROS comment: Ef 55%     Neuro/Psych:               GI/Hepatic/Renal:             Endo/Other:    (+) Diabetes, . Abdominal:             Vascular:           Other Findings:             Anesthesia Plan      general     ASA 3         arterial line                        Demarco Melgoza MD   10/14/2021

## 2021-10-14 NOTE — OP NOTE
Operative Note      Patient: Travis Woods  YOB: 1960  MRN: [id number]    Date of Procedure: 10/14/2021    Pre-Op Diagnosis: I65.23 RIGHT CAROTID STENOSIS    Post-Op Diagnosis: Same       Procedure(s):  RIGHT CAROTID ENDARTERECTOMY    Surgeon(s):  Lupis Wood MD    Assistant:   * No surgical staff found *    Anesthesia: General    Estimated Blood Loss (mL): less than 084     Complications: None    Specimens:   ID Type Source Tests Collected by Time Destination   A : A) RIGHT CAROTID PLAQUE Tissue Tissue SURGICAL PATHOLOGY Lupis Wood MD 10/14/2021 4482        Implants:  Implant Name Type Inv. Item Serial No.  Lot No. LRB No. Used Action   PATCH VASC W0.8XL8CM PERIPH BOV PERICARD N PVC N DEHP CRSS Vascular grafts PATCH VASC W0.8XL8CM PERIPH BOV PERICARD N PVC N DEHP CRSS  [employer] [id number] Right 1 Implanted         Drains: * No LDAs found *    Findings: as above    Detailed Description of Procedure:         Preoperative Diagnosis:   Right Carotid Stenosis    Postoperative Diagnosis:  Same    Procedure:   1. Right carotid endarterectomy    2.  Completion Duplex Scan    Surgeon: Lupis Wood MD    Date of Surgery: 10/14/21      Indications: carotid occlusive disease     Anesthesia: General endotrachial anesthesia    Findings:    Plaque Characteristics: 90% stenosis,      Ulceration: No      Fresh Thrombus: No    Cerebral Protection:   Blood pressure maintenance      Bonds-[place]ahara shunt    Completion Duplex:  No B-mode abnormalities      ICA with low resistive waveform pattern    Drain:   none  Closure: Platysma : Running 3-0 vicryl Skin: Running 4-0 moncryl    Dressing: Dermabond/Steristrip, Gauze    Sponge, Needle, Instrument Counts: Correct    Estimated Blood Loss: 100 ml    Fluids: 500 ml crystalloid        Disposition:   The patient awoke in the operating room, was extubated, moved all 4 extremities to command, and returned to PACU in satisfactory completion, the shunt was clamped and removed from the internal carotid. The internal carotid was backbled and clamped. The shunt was then removed from the common carotid. This vessel was flushed and then clamped. REPERFUSION AND CLOSURE       The distal internal and external carotids were backbled and the proximal common carotid was flushed. The endarterectomy bed was then flushed with heparinized saline and the suture line was secured. Flow was sequentially restored into the external followed by the internal carotid arteries. Hemostasis was obtained with surgicel and protamine. The duplex scan probe was then brought onto the field and the aforementioned findings were noted. Hemostasis was noted to be excellent. Platysma was approximated using a running technnique and skin was approximated with a running subcuticilar technique. Matt Hurley M.D., SONIDO.   10/14/2021  9:03 AM        Electronically signed by Sivakumar Rizo MD on 10/14/2021 at 9:03 AM

## 2021-10-15 VITALS
TEMPERATURE: 97.6 F | RESPIRATION RATE: 17 BRPM | SYSTOLIC BLOOD PRESSURE: 145 MMHG | BODY MASS INDEX: 31.11 KG/M2 | DIASTOLIC BLOOD PRESSURE: 59 MMHG | WEIGHT: 198.19 LBS | HEIGHT: 67 IN | HEART RATE: 72 BPM | OXYGEN SATURATION: 94 %

## 2021-10-15 LAB
BASOPHILS ABSOLUTE: 0.1 K/UL (ref 0–0.2)
BASOPHILS RELATIVE PERCENT: 0.8 %
EOSINOPHILS ABSOLUTE: 0 K/UL (ref 0–0.6)
EOSINOPHILS RELATIVE PERCENT: 0.1 %
HCT VFR BLD CALC: 39.1 % (ref 40.5–52.5)
HEMOGLOBIN: 13.5 G/DL (ref 13.5–17.5)
LYMPHOCYTES ABSOLUTE: 2.2 K/UL (ref 1–5.1)
LYMPHOCYTES RELATIVE PERCENT: 13.3 %
MCH RBC QN AUTO: 31.6 PG (ref 26–34)
MCHC RBC AUTO-ENTMCNC: 34.5 G/DL (ref 31–36)
MCV RBC AUTO: 91.7 FL (ref 80–100)
MONOCYTES ABSOLUTE: 1.2 K/UL (ref 0–1.3)
MONOCYTES RELATIVE PERCENT: 7.6 %
NEUTROPHILS ABSOLUTE: 12.7 K/UL (ref 1.7–7.7)
NEUTROPHILS RELATIVE PERCENT: 78.2 %
PDW BLD-RTO: 13.5 % (ref 12.4–15.4)
PLATELET # BLD: 202 K/UL (ref 135–450)
PMV BLD AUTO: 7.5 FL (ref 5–10.5)
RBC # BLD: 4.26 M/UL (ref 4.2–5.9)
WBC # BLD: 16.2 K/UL (ref 4–11)

## 2021-10-15 PROCEDURE — 6370000000 HC RX 637 (ALT 250 FOR IP): Performed by: NURSE PRACTITIONER

## 2021-10-15 PROCEDURE — APPSS30 APP SPLIT SHARED TIME 16-30 MINUTES: Performed by: NURSE PRACTITIONER

## 2021-10-15 PROCEDURE — 6370000000 HC RX 637 (ALT 250 FOR IP): Performed by: SURGERY

## 2021-10-15 PROCEDURE — APPNB30 APP NON BILLABLE TIME 0-30 MINS: Performed by: NURSE PRACTITIONER

## 2021-10-15 PROCEDURE — 85025 COMPLETE CBC W/AUTO DIFF WBC: CPT

## 2021-10-15 PROCEDURE — 2580000003 HC RX 258: Performed by: SURGERY

## 2021-10-15 RX ADMIN — ESCITALOPRAM OXALATE 20 MG: 10 TABLET ORAL at 07:55

## 2021-10-15 RX ADMIN — ROSUVASTATIN CALCIUM 40 MG: 40 TABLET, COATED ORAL at 07:55

## 2021-10-15 RX ADMIN — ATENOLOL 50 MG: 50 TABLET ORAL at 07:56

## 2021-10-15 RX ADMIN — ACETAMINOPHEN 650 MG: 325 TABLET ORAL at 07:59

## 2021-10-15 RX ADMIN — CLOPIDOGREL BISULFATE 75 MG: 75 TABLET ORAL at 07:56

## 2021-10-15 RX ADMIN — BENZOCAINE AND MENTHOL 1 LOZENGE: 15; 3.6 LOZENGE ORAL at 09:29

## 2021-10-15 RX ADMIN — METFORMIN HYDROCHLORIDE 1000 MG: 500 TABLET, EXTENDED RELEASE ORAL at 07:55

## 2021-10-15 RX ADMIN — ASPIRIN 81 MG: 81 TABLET, COATED ORAL at 07:56

## 2021-10-15 RX ADMIN — Medication 10 ML: at 08:00

## 2021-10-15 ASSESSMENT — PAIN SCALES - GENERAL
PAINLEVEL_OUTOF10: 2
PAINLEVEL_OUTOF10: 0

## 2021-10-15 NOTE — PROGRESS NOTES
NEURO: A&O     CARDIAC: NSR, s/p R CEA     RESP: Room air, clear lungs     GI: Pt eating on his own     SKIN: Intact, R CEA incision closed with glue     : Uses bathroom on his own     LINES: 2 PIV     GTTS: intermittent antibiotics    Pt up ad rangel, walking unit on his own. Plan on DC tomorrow        See flowsheets for details, VS, MAR for meds and titration.

## 2021-10-15 NOTE — PROGRESS NOTES
Vascular Progress Note    10/15/2021 8:12 AM    Chief complaint / Reason for visit : s/p right CEA - POD # 1    Subjective:  Patient sitting up in bed. He reports he is doing well. He has been tolerating a diet, ambulating well and voiding adequately. No new neurological changes from baseline. Has a slight right sided headache in the back of his head, but has had this prior to surgery. Appears hemodynamically stable, afebrile.      Vital Signs: BP (!) 145/59   Pulse 72   Temp 97.6 °F (36.4 °C) (Temporal)   Resp 17   Ht 5' 7\" (1.702 m)   Wt 198 lb 3.1 oz (89.9 kg)   SpO2 94%   BMI 31.04 kg/m²  O2 Flow Rate (L/min): 0 L/min   I/O:      Intake/Output Summary (Last 24 hours) at 10/15/2021 8231  Last data filed at 10/14/2021 0546  Gross per 24 hour   Intake 1600 ml   Output --   Net 1600 ml       Physical Exam:   General: no apparent distress, appears stated age  Neuro: AAOx4, muscle strength equal bilaterally, clear speech, no facial droop  Chest/Lungs: clear to auscultation bilaterally, no accessory muscle use  Cardiac:  regular rate and rhythm, S1, S2 normal, no murmur, click, rub or gallop  Abdomen:  abdomen is soft without significant tenderness, masses, organomegaly or guarding  Vascular: palpable right STA pulse no bruit  Extremities: warm and well perfused, no signs of cyanosis or ischemia, no significant edema, bilateral upper and lower extremity motorsensory intact  Skin: right neck incision intact with no drainage, no erythema, no hematoma    Labs:   Lab Results   Component Value Date     10/14/2021    K 4.3 10/14/2021     10/14/2021    CO2 23 10/14/2021    BUN 18 10/14/2021    CREATININE 1.0 10/14/2021    GFRAA >60 10/14/2021    GFRAA >60 02/05/2013    LABGLOM >60 10/14/2021    GLUCOSE 180 10/14/2021    CALCIUM 9.7 10/14/2021     Lab Results   Component Value Date    WBC 16.2 10/15/2021    RBC 4.26 10/15/2021    HGB 13.5 10/15/2021    HCT 39.1 10/15/2021    MCV 91.7 10/15/2021    RDW 13.5 10/15/2021     10/15/2021     Lab Results   Component Value Date    INR 0.86 (L) 10/11/2021    PROTIME 9.6 (L) 10/11/2021          Scheduled Meds:    sodium chloride flush  5-40 mL IntraVENous 2 times per day    aspirin  81 mg Oral Daily    atenolol  50 mg Oral Daily    clopidogrel  75 mg Oral Daily    rosuvastatin  40 mg Oral Daily    escitalopram  20 mg Oral Daily    metFORMIN  1,000 mg Oral BID WC    nicotine  1 patch TransDERmal Daily     Continuous Infusions:    sodium chloride      lactated ringers 100 mL/hr at 10/14/21 1219         Assessment:   S/p right CEA - POD # 1 - no neurological changes from baseline  HTN  HLD  DM  Tobacco use    Plan:  D/c IVFs  Diet advanced  Increase ambulation  Pain controlled  Home medications resumed  Continued on home ASA, Plavix and statin therapy  Smoking cessation  Discharge home today  Follow up with Dr. Octaviano Hurley in 2-3 weeks    Patient educated on plan of care and disease process. All questions answered.         Electronically signed by ANITA Schafer CNP on 10/15/2021 at 8:12 AM

## 2021-11-02 ENCOUNTER — OFFICE VISIT (OUTPATIENT)
Dept: VASCULAR SURGERY | Age: 61
End: 2021-11-02

## 2021-11-02 VITALS
HEIGHT: 67 IN | WEIGHT: 190 LBS | DIASTOLIC BLOOD PRESSURE: 88 MMHG | SYSTOLIC BLOOD PRESSURE: 148 MMHG | BODY MASS INDEX: 29.82 KG/M2

## 2021-11-02 DIAGNOSIS — I65.23 CAROTID ATHEROSCLEROSIS, BILATERAL: Primary | ICD-10-CM

## 2021-11-02 DIAGNOSIS — Z09 POSTOPERATIVE EXAMINATION: ICD-10-CM

## 2021-11-02 PROCEDURE — 99024 POSTOP FOLLOW-UP VISIT: CPT | Performed by: SURGERY

## 2021-11-02 NOTE — PROGRESS NOTES
Postop Progress Note    Subjective    Manohar Melgoza presents to the office for postop follow up. For his first preoperative visit following right carotid endarterectomy October 14, 2021. Patient with known left internal carotid artery occlusion. Overall doing well. Still with a little bit of tenderness at the surgical site denies TIA or stroke    Objective    There were no vitals filed for this visit. General: alert, cooperative and no distress  Incision: healing well  No erythema or drainage. Trace edema noted. Assessment  Doing well postoperatively. Plan  1. Carotid atherosclerosis, bilateral  Doing well following right carotid endarterectomy. Recommend follow-up carotid duplex scan in 6 months. Okay to resume activities as tolerated  - VL DUP CAROTID BILATERAL;  Future        Electronically signed by Cheyanne Hall MD on 11/2/2021 at 8:14 AM

## 2022-01-06 ENCOUNTER — TELEPHONE (OUTPATIENT)
Dept: ORTHOPEDIC SURGERY | Age: 62
End: 2022-01-06

## 2022-01-18 DIAGNOSIS — F41.9 ANXIETY: ICD-10-CM

## 2022-01-18 RX ORDER — ESCITALOPRAM OXALATE 10 MG/1
TABLET ORAL
Qty: 30 TABLET | Refills: 11 | Status: SHIPPED | OUTPATIENT
Start: 2022-01-18 | End: 2022-04-07 | Stop reason: SDUPTHER

## 2022-01-18 NOTE — TELEPHONE ENCOUNTER
Medication:   Requested Prescriptions     Pending Prescriptions Disp Refills    escitalopram (LEXAPRO) 10 MG tablet [Pharmacy Med Name: ESCITALOPRAM 10 MG TABLET] 30 tablet 11     Sig: TAKE 1 TABLET BY MOUTH EVERY DAY        Last Filled:      Patient Phone Number: [phone] (home)     Last appt: 7/29/2020   Next appt: Visit date not found    Last OARRS: No flowsheet data found.

## 2022-02-19 DIAGNOSIS — F41.9 ANXIETY: ICD-10-CM

## 2022-02-28 RX ORDER — ESCITALOPRAM OXALATE 20 MG/1
TABLET ORAL
Qty: 30 TABLET | Refills: 17 | Status: SHIPPED | OUTPATIENT
Start: 2022-02-28 | End: 2022-04-07 | Stop reason: SDUPTHER

## 2022-03-22 DIAGNOSIS — I25.10 CORONARY ARTERY DISEASE WITHOUT ANGINA PECTORIS, UNSPECIFIED VESSEL OR LESION TYPE, UNSPECIFIED WHETHER NATIVE OR TRANSPLANTED HEART: ICD-10-CM

## 2022-03-22 RX ORDER — CLOPIDOGREL BISULFATE 75 MG/1
TABLET ORAL
Qty: 30 TABLET | Refills: 8 | OUTPATIENT
Start: 2022-03-22

## 2022-03-22 NOTE — TELEPHONE ENCOUNTER
Medication:   Requested Prescriptions     Pending Prescriptions Disp Refills    clopidogrel (PLAVIX) 75 MG tablet [Pharmacy Med Name: CLOPIDOGREL 75 MG TABLET] 30 tablet 8     Sig: TAKE 1 TABLET BY MOUTH EVERY DAY        Last Filled:      Patient Phone Number: [phone] (home)     Last appt: 7/29/2020   Next appt: Visit date not found    Last OARRS: No flowsheet data found.

## 2022-04-07 DIAGNOSIS — I10 ESSENTIAL HYPERTENSION: ICD-10-CM

## 2022-04-07 DIAGNOSIS — F41.9 ANXIETY: ICD-10-CM

## 2022-04-07 DIAGNOSIS — E11.9 TYPE 2 DIABETES MELLITUS WITHOUT COMPLICATION, WITHOUT LONG-TERM CURRENT USE OF INSULIN (HCC): ICD-10-CM

## 2022-04-07 DIAGNOSIS — E78.2 MIXED HYPERLIPIDEMIA: ICD-10-CM

## 2022-04-07 DIAGNOSIS — I25.10 CORONARY ARTERY DISEASE WITHOUT ANGINA PECTORIS, UNSPECIFIED VESSEL OR LESION TYPE, UNSPECIFIED WHETHER NATIVE OR TRANSPLANTED HEART: ICD-10-CM

## 2022-04-07 RX ORDER — ESCITALOPRAM OXALATE 10 MG/1
20 TABLET ORAL DAILY
Qty: 90 TABLET | Refills: 3 | Status: SHIPPED | OUTPATIENT
Start: 2022-04-07 | End: 2022-05-10 | Stop reason: SDUPTHER

## 2022-04-07 RX ORDER — ESCITALOPRAM OXALATE 20 MG/1
20 TABLET ORAL DAILY
Qty: 90 TABLET | Refills: 3 | Status: SHIPPED | OUTPATIENT
Start: 2022-04-07 | End: 2022-05-10 | Stop reason: SDUPTHER

## 2022-04-07 RX ORDER — NITROGLYCERIN 0.4 MG/1
0.4 TABLET SUBLINGUAL EVERY 5 MIN PRN
Qty: 25 TABLET | Refills: 3 | Status: SHIPPED | OUTPATIENT
Start: 2022-04-07 | End: 2022-05-10 | Stop reason: SDUPTHER

## 2022-04-07 RX ORDER — CLOPIDOGREL BISULFATE 75 MG/1
75 TABLET ORAL DAILY
Qty: 90 TABLET | Refills: 2 | Status: SHIPPED | OUTPATIENT
Start: 2022-04-07 | End: 2022-05-10 | Stop reason: SDUPTHER

## 2022-04-07 RX ORDER — ALPRAZOLAM 1 MG/1
1 TABLET ORAL 3 TIMES DAILY PRN
Qty: 30 TABLET | Refills: 3 | Status: SHIPPED | OUTPATIENT
Start: 2022-04-07 | End: 2022-10-07 | Stop reason: SDUPTHER

## 2022-04-07 RX ORDER — ASPIRIN 81 MG/1
81 TABLET ORAL DAILY
Qty: 90 TABLET | Refills: 5 | Status: SHIPPED | OUTPATIENT
Start: 2022-04-07 | End: 2022-05-10 | Stop reason: SDUPTHER

## 2022-04-07 RX ORDER — METFORMIN HYDROCHLORIDE 500 MG/1
500 TABLET, EXTENDED RELEASE ORAL
Qty: 360 TABLET | Refills: 2 | Status: SHIPPED | OUTPATIENT
Start: 2022-04-07 | End: 2022-04-18

## 2022-04-07 RX ORDER — ATENOLOL 50 MG/1
50 TABLET ORAL DAILY
Qty: 90 TABLET | Refills: 3 | Status: SHIPPED | OUTPATIENT
Start: 2022-04-07 | End: 2022-05-10 | Stop reason: SDUPTHER

## 2022-04-07 RX ORDER — ROSUVASTATIN CALCIUM 40 MG/1
40 TABLET, COATED ORAL DAILY
Qty: 90 TABLET | Refills: 3 | Status: SHIPPED | OUTPATIENT
Start: 2022-04-07 | End: 2022-05-10 | Stop reason: SDUPTHER

## 2022-04-07 RX ORDER — EZETIMIBE 10 MG/1
10 TABLET ORAL DAILY
Qty: 90 TABLET | Refills: 3 | Status: SHIPPED | OUTPATIENT
Start: 2022-04-07 | End: 2022-05-10 | Stop reason: SDUPTHER

## 2022-04-18 DIAGNOSIS — E11.9 TYPE 2 DIABETES MELLITUS WITHOUT COMPLICATION, WITHOUT LONG-TERM CURRENT USE OF INSULIN (HCC): ICD-10-CM

## 2022-04-18 RX ORDER — METFORMIN HYDROCHLORIDE 500 MG/1
TABLET, EXTENDED RELEASE ORAL
Qty: 120 TABLET | Refills: 8 | Status: SHIPPED | OUTPATIENT
Start: 2022-04-18 | End: 2022-05-10 | Stop reason: SDUPTHER

## 2022-04-18 NOTE — TELEPHONE ENCOUNTER
Medication:   Requested Prescriptions     Pending Prescriptions Disp Refills    metFORMIN (GLUCOPHAGE-XR) 500 MG extended release tablet [Pharmacy Med Name: METFORMIN HCL  MG TABLET] 120 tablet 8     Sig: TAKE 2 TABLETS BY MOUTH TWICE A DAY        Last Filled:      Patient Phone Number: [phone] (home)     Last appt: 7/29/2020   Next appt: 5/10/2022    Last OARRS: No flowsheet data found.

## 2022-05-10 ENCOUNTER — OFFICE VISIT (OUTPATIENT)
Dept: PRIMARY CARE CLINIC | Age: 62
End: 2022-05-10
Payer: COMMERCIAL

## 2022-05-10 ENCOUNTER — PATIENT MESSAGE (OUTPATIENT)
Dept: PRIMARY CARE CLINIC | Age: 62
End: 2022-05-10

## 2022-05-10 ENCOUNTER — PROCEDURE VISIT (OUTPATIENT)
Dept: VASCULAR SURGERY | Age: 62
End: 2022-05-10
Payer: COMMERCIAL

## 2022-05-10 ENCOUNTER — OFFICE VISIT (OUTPATIENT)
Dept: VASCULAR SURGERY | Age: 62
End: 2022-05-10

## 2022-05-10 VITALS
DIASTOLIC BLOOD PRESSURE: 100 MMHG | HEIGHT: 67 IN | SYSTOLIC BLOOD PRESSURE: 160 MMHG | WEIGHT: 195.8 LBS | OXYGEN SATURATION: 99 % | HEART RATE: 67 BPM | TEMPERATURE: 98 F | BODY MASS INDEX: 30.73 KG/M2

## 2022-05-10 VITALS
HEIGHT: 67 IN | DIASTOLIC BLOOD PRESSURE: 80 MMHG | WEIGHT: 194.8 LBS | BODY MASS INDEX: 30.57 KG/M2 | SYSTOLIC BLOOD PRESSURE: 160 MMHG

## 2022-05-10 DIAGNOSIS — I25.10 CORONARY ARTERY DISEASE WITHOUT ANGINA PECTORIS, UNSPECIFIED VESSEL OR LESION TYPE, UNSPECIFIED WHETHER NATIVE OR TRANSPLANTED HEART: ICD-10-CM

## 2022-05-10 DIAGNOSIS — Z00.00 PHYSICAL EXAM: Primary | ICD-10-CM

## 2022-05-10 DIAGNOSIS — E78.2 MIXED HYPERLIPIDEMIA: ICD-10-CM

## 2022-05-10 DIAGNOSIS — I65.23 CAROTID ATHEROSCLEROSIS, BILATERAL: ICD-10-CM

## 2022-05-10 DIAGNOSIS — I65.23 CAROTID ATHEROSCLEROSIS, BILATERAL: Primary | ICD-10-CM

## 2022-05-10 DIAGNOSIS — I10 ESSENTIAL HYPERTENSION: ICD-10-CM

## 2022-05-10 DIAGNOSIS — Z12.11 SCREEN FOR COLON CANCER: ICD-10-CM

## 2022-05-10 DIAGNOSIS — Z23 NEED FOR SHINGLES VACCINE: ICD-10-CM

## 2022-05-10 DIAGNOSIS — Z23 NEED FOR PNEUMOCOCCAL VACCINATION: ICD-10-CM

## 2022-05-10 DIAGNOSIS — Z23 HIGH PRIORITY FOR COVID-19 VACCINATION: ICD-10-CM

## 2022-05-10 DIAGNOSIS — F41.9 ANXIETY: ICD-10-CM

## 2022-05-10 DIAGNOSIS — E11.9 TYPE 2 DIABETES MELLITUS WITHOUT COMPLICATION, WITHOUT LONG-TERM CURRENT USE OF INSULIN (HCC): ICD-10-CM

## 2022-05-10 LAB — HBA1C MFR BLD: 8.4 %

## 2022-05-10 PROCEDURE — 99396 PREV VISIT EST AGE 40-64: CPT | Performed by: INTERNAL MEDICINE

## 2022-05-10 PROCEDURE — 90677 PCV20 VACCINE IM: CPT | Performed by: INTERNAL MEDICINE

## 2022-05-10 PROCEDURE — 99213 OFFICE O/P EST LOW 20 MIN: CPT | Performed by: SURGERY

## 2022-05-10 PROCEDURE — 90471 IMMUNIZATION ADMIN: CPT | Performed by: INTERNAL MEDICINE

## 2022-05-10 PROCEDURE — 83036 HEMOGLOBIN GLYCOSYLATED A1C: CPT | Performed by: INTERNAL MEDICINE

## 2022-05-10 PROCEDURE — 93880 EXTRACRANIAL BILAT STUDY: CPT | Performed by: SURGERY

## 2022-05-10 RX ORDER — ASPIRIN 81 MG/1
81 TABLET ORAL DAILY
Qty: 90 TABLET | Refills: 5 | Status: SHIPPED | OUTPATIENT
Start: 2022-05-10

## 2022-05-10 RX ORDER — NITROGLYCERIN 0.4 MG/1
0.4 TABLET SUBLINGUAL EVERY 5 MIN PRN
Qty: 25 TABLET | Refills: 3 | Status: SHIPPED | OUTPATIENT
Start: 2022-05-10

## 2022-05-10 RX ORDER — ESCITALOPRAM OXALATE 10 MG/1
20 TABLET ORAL DAILY
Qty: 90 TABLET | Refills: 3 | Status: SHIPPED | OUTPATIENT
Start: 2022-05-10

## 2022-05-10 RX ORDER — EZETIMIBE 10 MG/1
10 TABLET ORAL DAILY
Qty: 90 TABLET | Refills: 3 | Status: SHIPPED | OUTPATIENT
Start: 2022-05-10 | End: 2022-07-12 | Stop reason: SDUPTHER

## 2022-05-10 RX ORDER — CLOPIDOGREL BISULFATE 75 MG/1
75 TABLET ORAL DAILY
Qty: 90 TABLET | Refills: 2 | Status: SHIPPED | OUTPATIENT
Start: 2022-05-10

## 2022-05-10 RX ORDER — ROSUVASTATIN CALCIUM 40 MG/1
40 TABLET, COATED ORAL DAILY
Qty: 90 TABLET | Refills: 3 | Status: SHIPPED | OUTPATIENT
Start: 2022-05-10 | End: 2022-07-07 | Stop reason: SDUPTHER

## 2022-05-10 RX ORDER — METFORMIN HYDROCHLORIDE 500 MG/1
1000 TABLET, EXTENDED RELEASE ORAL 2 TIMES DAILY
Qty: 120 TABLET | Refills: 8 | Status: SHIPPED | OUTPATIENT
Start: 2022-05-10 | End: 2022-07-12 | Stop reason: SDUPTHER

## 2022-05-10 RX ORDER — ESCITALOPRAM OXALATE 20 MG/1
20 TABLET ORAL DAILY
Qty: 90 TABLET | Refills: 3 | Status: SHIPPED | OUTPATIENT
Start: 2022-05-10

## 2022-05-10 RX ORDER — ATENOLOL 50 MG/1
50 TABLET ORAL DAILY
Qty: 90 TABLET | Refills: 3 | Status: SHIPPED | OUTPATIENT
Start: 2022-05-10 | End: 2022-07-12 | Stop reason: SDUPTHER

## 2022-05-10 SDOH — ECONOMIC STABILITY: FOOD INSECURITY: WITHIN THE PAST 12 MONTHS, YOU WORRIED THAT YOUR FOOD WOULD RUN OUT BEFORE YOU GOT MONEY TO BUY MORE.: NEVER TRUE

## 2022-05-10 SDOH — ECONOMIC STABILITY: FOOD INSECURITY: WITHIN THE PAST 12 MONTHS, THE FOOD YOU BOUGHT JUST DIDN'T LAST AND YOU DIDN'T HAVE MONEY TO GET MORE.: NEVER TRUE

## 2022-05-10 ASSESSMENT — PATIENT HEALTH QUESTIONNAIRE - PHQ9
SUM OF ALL RESPONSES TO PHQ9 QUESTIONS 1 & 2: 0
SUM OF ALL RESPONSES TO PHQ QUESTIONS 1-9: 0
2. FEELING DOWN, DEPRESSED OR HOPELESS: 0
SUM OF ALL RESPONSES TO PHQ QUESTIONS 1-9: 0
1. LITTLE INTEREST OR PLEASURE IN DOING THINGS: 0

## 2022-05-10 ASSESSMENT — ENCOUNTER SYMPTOMS
GASTROINTESTINAL NEGATIVE: 1
DIARRHEA: 0
BLOOD IN STOOL: 0
CHEST TIGHTNESS: 0
CONSTIPATION: 0
WHEEZING: 0
COUGH: 0
EYES NEGATIVE: 1
SHORTNESS OF BREATH: 0

## 2022-05-10 ASSESSMENT — SOCIAL DETERMINANTS OF HEALTH (SDOH): HOW HARD IS IT FOR YOU TO PAY FOR THE VERY BASICS LIKE FOOD, HOUSING, MEDICAL CARE, AND HEATING?: NOT HARD AT ALL

## 2022-05-10 NOTE — LETTER
[facility]) - Vascular and Endovascular Surgeons  [place] [address]  Phone: [phone]  Fax: [phone]           Josias Graham MD      May 10, 2022     Patient: Rhiannon Weaver   MR Number: [id number]   YOB: 1960   Date of Visit: 5/10/2022       Dear Dr. Gil Walker: Thank you for referring Adina Voss to me for evaluation/treatment. Below are the relevant portions of my assessment and plan of care. If you have questions, please do not hesitate to call me. I look forward to following Samuel Calixto along with you.     Sincerely,        Josias Graham MD    CC providers:  Shaunna Mcardle, [address] [facility] [address]  Via In [facility] [address]

## 2022-05-10 NOTE — PROGRESS NOTES
Subjective:      Patient ID: Jackie Tirado is a 63 y.o. male. 5/10/2022 Patient presents with:  Check-Up  Hyperlipidemia  Hypertension  Diabetes        . Carotid atherosclerosis, bilateral  S/p right carotid endarterectomy. 10/21    Recommend follow-up carotid duplex scan in 6 months. - VL DUP CAROTID BILATERAL; Future            Last seen  VV  7/29/2020 Patient presents with: Follow-Up from [address] 7/20/20  DOD 7/21/20    Jackie Tirado was admitted with chest pain. He had been at [facility] over the weekend after presenting with a NSTEMI but left b/e severe anxiety. He underwent LHC on 7/20/2020 with resultant KUMAR to LAD. He has severe PAD with infrarenal aortic occlusion and CAD with prior PCI       Last seen by me  1/9/2020       S/P Stents . 8/11    Still smokes ! .    H/O chronic anxiety . Diabetes  Hypoglycemia symptoms include nervousness/anxiousness (under control with Lexapro). Pertinent negatives for hypoglycemia include no dizziness or tremors. Pertinent negatives for diabetes include no fatigue and no weakness. Hyperlipidemia  Pertinent negatives include no shortness of breath. Hypertension  Pertinent negatives include no shortness of breath. Review of Systems   Constitutional: Negative for chills, fatigue and fever. . Refuses Flu vac     tdap 8/18     Pneum vac   5/22 ; 8/18     Shingrex/2/20    covid vac 3 rd booster 2021   HENT:        Dental Exam  Reg    Eyes: Negative. Eye Ex  1/21   Respiratory: Negative for cough, chest tightness, shortness of breath and wheezing. Smoking 4 cig / d         Was Smoking  1/2 ppd     . No Etoh . No rec drugs . No asthma . Cardiovascular: Negative. Known CAD  ;S/P Stents 8/11 . Last exam 7/19 with Vascular Dr  For Carotid art scan     Walks reg , no discomfort    Father with CAD at 36 ! Gastrointestinal: Negative. Negative for blood in stool, constipation and diarrhea. No FH of ca colon . Colonoscopy ? Endocrine:        Diabetes 2015   Genitourinary: Negative for dysuria, frequency and urgency. No FH of ca prostate    Musculoskeletal: Negative. Skin: Negative for rash. Neurological: Negative for dizziness, tremors, weakness and light-headedness. Psychiatric/Behavioral: Negative for sleep disturbance. The patient is nervous/anxious (under control with Lexapro). Objective:   Physical Exam  Vitals reviewed. Constitutional:       General: He is not in acute distress. Eyes:      Extraocular Movements: Extraocular movements intact. Cardiovascular:      Rate and Rhythm: Normal rate and regular rhythm. Heart sounds: Normal heart sounds. Pulmonary:      Effort: Pulmonary effort is normal.      Breath sounds: Normal breath sounds. Abdominal:      General: There is distension. Palpations: Abdomen is soft. Musculoskeletal:         General: Normal range of motion. Cervical back: Neck supple. Right lower leg: No edema. Left lower leg: No edema. Comments:      Skin:     General: Skin is warm. Neurological:      General: No focal deficit present. Mental Status: He is alert and oriented to person, place, and time. Psychiatric:         Mood and Affect: Mood normal.         Behavior: Behavior normal.         Assessment:      Ruben Weber was seen today for check-up, hyperlipidemia, hypertension and diabetes. Diagnoses and all orders for this visit:    Physical exam  -     CBC with Auto Differential; Future  -     Comprehensive Metabolic Panel; Future  -     Lipid Panel; Future  -     TSH; Future  -     Urinalysis; Future  -     Vitamin D 25 Hydroxy; Future  -     MICROALBUMIN / CREATININE URINE RATIO; Future    Type 2 diabetes mellitus without complication, without long-term current use of insulin (HCC)  -     POCT glycosylated hemoglobin (Hb A1C)  -     Comprehensive Metabolic Panel; Future  -     Lipid Panel;  Future  -     Urinalysis; Future  -     MICROALBUMIN / CREATININE URINE RATIO; Future  -     metFORMIN (GLUCOPHAGE-XR) 500 MG extended release tablet; Take 2 tablets by mouth 2 times daily  -     dapagliflozin (FARXIGA) 5 MG tablet; Take 1 tablet by mouth every morning  -     rosuvastatin (CRESTOR) 40 MG tablet; Take 1 tablet by mouth daily  -     ezetimibe (ZETIA) 10 MG tablet; Take 1 tablet by mouth daily  -     aspirin ([employer] ASPIRIN LOW STRENGTH) 81 MG EC tablet; Take 1 tablet by mouth daily    High priority for COVID-19 vaccination    Need for pneumococcal vaccination  -     Pneumococcal Conjugate PCV20, PF (Prevnar 20)    Need for shingles vaccine  -     zoster recombinant adjuvanted vaccine (SHINGRIX) 50 MCG/0.5ML SUSR injection; Inject 0.5 mLs into the muscle once for 1 dose    Screen for colon cancer  -     Fecal DNA Colorectal cancer screening (Colog[facility]ard)    Coronary artery disease without angina pectoris, unspecified vessel or lesion type, unspecified whether native or transplanted heart  -     rosuvastatin (CRESTOR) 40 MG tablet; Take 1 tablet by mouth daily  -     ezetimibe (ZETIA) 10 MG tablet; Take 1 tablet by mouth daily  -     atenolol (TENORMIN) 50 MG tablet; Take 1 tablet by mouth daily  -     aspirin ([employer] ASPIRIN LOW STRENGTH) 81 MG EC tablet; Take 1 tablet by mouth daily  -     clopidogrel (PLAVIX) 75 MG tablet; Take 1 tablet by mouth daily  -     nitroGLYCERIN (NITROSTAT) 0.4 MG SL tablet; Place 1 tablet under the tongue every 5 minutes as needed for Chest pain up to max of 3 total doses. If no relief after 1 dose, call 911. Mixed hyperlipidemia   On crestor + Zetia   -     ezetimibe (ZETIA) 10 MG tablet; Take 1 tablet by mouth daily    Anxiety  Better with lexapro 30 mg / d   -     escitalopram (LEXAPRO) 20 MG tablet; Take 1 tablet by mouth daily  -     escitalopram (LEXAPRO) 10 MG tablet; Take 2 tablets by mouth daily    Essential hypertension   Advised low salt diet .  If still up add Hyzaar   -     atenolol (TENORMIN) 50 MG tablet; Take 1 tablet by mouth daily            Smoker cautioned very strongly . smoking less . He and his wife are actively trying to quit             Bilateral carotid artery disease ([address] Sees Vascular for q 6 mth                  Plan:      Self Management Goals.    Know which medication is for what condition:   Know side effects of medications, and discuss with doctor   Discuss side effects and instructions on new medications  Know correct dose/frequency of medications  Take medications at the same time each day  Stay current on medication refills  If taking OTC's check with MD/pharmacy first about interactions  Monitor sugars and record time/meal    LDL goal 313 or less  Systolic BP < or equal to 165  Diastolic BP < or equal to 80  Quota system,   Nicoderm and or Chantix   Advised  Flu and Pneumonia Vax  Set targets for weight loss 4 lbs per month  Exercise 3-5 times per week  Keep check of weight

## 2022-05-10 NOTE — PROGRESS NOTES
[facility])   Vascular Surgery Followup    Referring Provider:  Nathan Palomo MD     Chief Complaint   Patient presents with    Follow-up        History of Present Illness:  72-year-old male here today for follow-up of carotid atherosclerosis. Presents today for routine carotid duplex scan imaging. Patient with history of right carotid endarterectomy in October 2021. Known left ICA occlusion. Denies TIA stroke or amaurosis. Denies claudication    Past Medical History:   has a past medical history of Anxiety, CAD (coronary artery disease), Diabetes mellitus ([facility] [address] Hyperlipidemia, Hypertension, and PVD (peripheral vascular disease) ([facility] [address] Surgical History:   has a past surgical history that includes Coronary angioplasty and Carotid endarterectomy (Right, 10/14/2021). Social History:   reports that he has been smoking cigarettes. He has a 5.00 pack-year smoking history. He has never used smokeless tobacco. He reports current alcohol use of about 4.0 standard drinks of alcohol per week. He reports that he does not use drugs. Family History:  family history includes Heart Disease in his father; High Blood Pressure in his father and mother; High Cholesterol in his father.      Home Medications:  Current Outpatient Medications   Medication Sig Dispense Refill    metFORMIN (GLUCOPHAGE-XR) 500 MG extended release tablet Take 2 tablets by mouth 2 times daily 120 tablet 8    dapagliflozin (FARXIGA) 5 MG tablet Take 1 tablet by mouth every morning 30 tablet 5    rosuvastatin (CRESTOR) 40 MG tablet Take 1 tablet by mouth daily 90 tablet 3    ezetimibe (ZETIA) 10 MG tablet Take 1 tablet by mouth daily 90 tablet 3    escitalopram (LEXAPRO) 20 MG tablet Take 1 tablet by mouth daily 90 tablet 3    escitalopram (LEXAPRO) 10 MG tablet Take 2 tablets by mouth daily 90 tablet 3    atenolol (TENORMIN) 50 MG tablet Take 1 tablet by mouth daily 90 tablet 3    aspirin ([employer]S ASPIRIN LOW STRENGTH) 81 MG EC tablet Take 1 tablet by mouth daily 90 tablet 5    clopidogrel (PLAVIX) 75 MG tablet Take 1 tablet by mouth daily 90 tablet 2    nitroGLYCERIN (NITROSTAT) 0.4 MG SL tablet Place 1 tablet under the tongue every 5 minutes as needed for Chest pain up to max of 3 total doses. If no relief after 1 dose, call 911. 25 tablet 3    zoster recombinant adjuvanted vaccine (SHINGRIX) 50 MCG/0.5ML SUSR injection Inject 0.5 mLs into the muscle once for 1 dose 0.5 mL 0    Multiple Vitamins-Minerals (THERAPEUTIC MULTIVITAMIN-MINERALS) tablet Take 1 tablet by mouth daily       No current facility-administered medications for this visit. Allergies:  Patient has no known allergies. Review of Systems:   · Constitutional: there has been no unanticipated weight loss. There's been no change in energy level, sleep pattern, or activity level. · Eyes: No visual changes or diplopia. No scleral icterus. · ENT: No Headaches, hearing loss or vertigo. No mouth sores or sore throat. · Cardiovascular: Reviewed in HPI  · Respiratory: No cough or wheezing, no sputum production. No hematemesis. · Gastrointestinal: No abdominal pain, appetite loss, blood in stools. No change in bowel or bladder habits. · Genitourinary: No dysuria, trouble voiding, or hematuria. · Musculoskeletal:  No gait disturbance, weakness or joint complaints. · Integumentary: No rash or pruritis. · Neurological: No headache, diplopia, change in muscle strength, numbness or tingling. No change in gait, balance, coordination, mood, affect, memory, mentation, behavior. · Psychiatric: No anxiety, no depression. · Endocrine: No malaise, fatigue or temperature intolerance. No excessive thirst, fluid intake, or urination. No tremor. · Hematologic/Lymphatic: No abnormal bruising or bleeding, blood clots or swollen lymph nodes. · Allergic/Immunologic: No nasal congestion or hives.     Physical Examination:    Vitals:    05/10/22 1125   BP: (!) 160/80          General appearance: alert, appears stated age, cooperative and no distress  Head: Normocephalic, without obvious abnormality, atraumatic  Neck: no adenopathy, no carotid bruit, no JVD, supple, symmetrical, trachea midline and thyroid: not enlarged, symmetric, no tenderness/mass/nodules  Lungs: clear to auscultation bilaterally  Heart: regular rate and rhythm, S1, S2 normal, no murmur, click, rub or gallop  Abdomen: soft, non-tender. Bowel sounds normal. No masses,  no organomegaly  Extremities: extremities normal, atraumatic, no cyanosis or edema  Neurologic: Grossly normal    MEDICAL DECISION MAKING/TESTING  I have reviewed the testing personally and my interpretation is below. 50 to 69% right ICA stenosis. Occlusion of the left ICA    Assessment:     Patient Active Problem List   Diagnosis    CAD (coronary artery disease)    HTN (hypertension)    PVD (peripheral vascular disease) ([facility]    Nonspecific granulomatous    Anxiety    HLD (hyperlipidemia)    Coronary stent    Carotid artery disease ([facility] [address]    Erectile dysfunction    Smoker    Subclavian vein stenosis, left    Cardiac chest pain    CAD S/P percutaneous coronary angioplasty    NSTEMI (non-ST elevated myocardial infarction) ([facility] [address]    Carotid atherosclerosis, right       Plan:  1. Carotid atherosclerosis, bilateral  61-year-old male with stable asymptomatic carotid atherosclerosis. He has a widely patent right carotid endarterectomy site. His velocities would suggest 50 to 69% stenosis however this is secondary to his contralateral ICA occlusion. No evidence of recurrent disease. Recommend repeat duplex in 1 year  - VL DUP CAROTID BILATERAL; Future        Thank you for allowing me to participate in the care of this individual.  Please do not hesitate to contact me with any questions. Manav Pickens M.D., FACS.   5/10/2022  11:32 AM

## 2022-05-11 NOTE — TELEPHONE ENCOUNTER
From: Jennifer Salter  To: Dr. Platt Husbands: 5/10/2022 8:54 PM EDT  Subject: Covid card    Covid card

## 2022-05-11 NOTE — TELEPHONE ENCOUNTER
From: Samantha Balderas  To: Dr. Hargrove Rasp: 5/10/2022 10:31 PM EDT  Subject: Covid first card    Card

## 2022-05-22 ENCOUNTER — PATIENT MESSAGE (OUTPATIENT)
Dept: PRIMARY CARE CLINIC | Age: 62
End: 2022-05-22

## 2022-05-23 NOTE — TELEPHONE ENCOUNTER
From: Clarita Pollock  To: Dr. Andres Mcnair: 5/22/2022 7:39 PM EDT  Subject: Shot    Just letting you know that I got my second shingles shot Sunday 5/22/22

## 2022-06-11 LAB — NONINV COLON CA DNA+OCC BLD SCRN STL QL: NEGATIVE

## 2022-07-06 DIAGNOSIS — Z00.00 PHYSICAL EXAM: ICD-10-CM

## 2022-07-06 DIAGNOSIS — E11.9 TYPE 2 DIABETES MELLITUS WITHOUT COMPLICATION, WITHOUT LONG-TERM CURRENT USE OF INSULIN (HCC): ICD-10-CM

## 2022-07-06 LAB
A/G RATIO: 2.1 (ref 1.1–2.2)
ALBUMIN SERPL-MCNC: 4.9 G/DL (ref 3.4–5)
ALP BLD-CCNC: 81 U/L (ref 40–129)
ALT SERPL-CCNC: 24 U/L (ref 10–40)
ANION GAP SERPL CALCULATED.3IONS-SCNC: 15 MMOL/L (ref 3–16)
AST SERPL-CCNC: 20 U/L (ref 15–37)
BASOPHILS ABSOLUTE: 0.1 K/UL (ref 0–0.2)
BASOPHILS RELATIVE PERCENT: 1.1 %
BILIRUB SERPL-MCNC: <0.2 MG/DL (ref 0–1)
BUN BLDV-MCNC: 20 MG/DL (ref 7–20)
CALCIUM SERPL-MCNC: 9.9 MG/DL (ref 8.3–10.6)
CHLORIDE BLD-SCNC: 102 MMOL/L (ref 99–110)
CHOLESTEROL, TOTAL: 195 MG/DL (ref 0–199)
CO2: 21 MMOL/L (ref 21–32)
CREAT SERPL-MCNC: 1.1 MG/DL (ref 0.9–1.3)
EOSINOPHILS ABSOLUTE: 0.3 K/UL (ref 0–0.6)
EOSINOPHILS RELATIVE PERCENT: 3.7 %
GFR AFRICAN AMERICAN: >60
GFR NON-AFRICAN AMERICAN: >60
GLUCOSE BLD-MCNC: 221 MG/DL (ref 70–99)
HCT VFR BLD CALC: 46.2 % (ref 40.5–52.5)
HDLC SERPL-MCNC: 41 MG/DL (ref 40–60)
HEMOGLOBIN: 15.9 G/DL (ref 13.5–17.5)
LDL CHOLESTEROL CALCULATED: ABNORMAL MG/DL
LDL CHOLESTEROL DIRECT: 81 MG/DL
LYMPHOCYTES ABSOLUTE: 2.7 K/UL (ref 1–5.1)
LYMPHOCYTES RELATIVE PERCENT: 28.8 %
MCH RBC QN AUTO: 31.6 PG (ref 26–34)
MCHC RBC AUTO-ENTMCNC: 34.5 G/DL (ref 31–36)
MCV RBC AUTO: 91.7 FL (ref 80–100)
MONOCYTES ABSOLUTE: 0.8 K/UL (ref 0–1.3)
MONOCYTES RELATIVE PERCENT: 8.3 %
NEUTROPHILS ABSOLUTE: 5.3 K/UL (ref 1.7–7.7)
NEUTROPHILS RELATIVE PERCENT: 58.1 %
PDW BLD-RTO: 14.8 % (ref 12.4–15.4)
PLATELET # BLD: 227 K/UL (ref 135–450)
PMV BLD AUTO: 7.4 FL (ref 5–10.5)
POTASSIUM SERPL-SCNC: 4.2 MMOL/L (ref 3.5–5.1)
RBC # BLD: 5.04 M/UL (ref 4.2–5.9)
SODIUM BLD-SCNC: 138 MMOL/L (ref 136–145)
TOTAL PROTEIN: 7.2 G/DL (ref 6.4–8.2)
TRIGL SERPL-MCNC: 414 MG/DL (ref 0–150)
TSH SERPL DL<=0.05 MIU/L-ACNC: 1.26 UIU/ML (ref 0.27–4.2)
VITAMIN D 25-HYDROXY: 40.3 NG/ML
VLDLC SERPL CALC-MCNC: ABNORMAL MG/DL
WBC # BLD: 9.2 K/UL (ref 4–11)

## 2022-07-07 DIAGNOSIS — E11.9 TYPE 2 DIABETES MELLITUS WITHOUT COMPLICATION, WITHOUT LONG-TERM CURRENT USE OF INSULIN (HCC): ICD-10-CM

## 2022-07-07 DIAGNOSIS — I25.10 CORONARY ARTERY DISEASE WITHOUT ANGINA PECTORIS, UNSPECIFIED VESSEL OR LESION TYPE, UNSPECIFIED WHETHER NATIVE OR TRANSPLANTED HEART: ICD-10-CM

## 2022-07-07 RX ORDER — ROSUVASTATIN CALCIUM 40 MG/1
40 TABLET, COATED ORAL DAILY
Qty: 90 TABLET | Refills: 3 | Status: SHIPPED | OUTPATIENT
Start: 2022-07-07 | End: 2022-07-12 | Stop reason: SDUPTHER

## 2022-07-07 RX ORDER — ICOSAPENT ETHYL 500 MG/1
0.5 CAPSULE ORAL 2 TIMES DAILY WITH MEALS
Qty: 240 CAPSULE | Refills: 3 | Status: SHIPPED | OUTPATIENT
Start: 2022-07-07 | End: 2022-07-12 | Stop reason: SDUPTHER

## 2022-07-11 ENCOUNTER — TELEPHONE (OUTPATIENT)
Dept: ADMINISTRATIVE | Age: 62
End: 2022-07-11

## 2022-07-12 ENCOUNTER — OFFICE VISIT (OUTPATIENT)
Dept: PRIMARY CARE CLINIC | Age: 62
End: 2022-07-12
Payer: COMMERCIAL

## 2022-07-12 VITALS
DIASTOLIC BLOOD PRESSURE: 80 MMHG | HEART RATE: 64 BPM | TEMPERATURE: 98.2 F | OXYGEN SATURATION: 98 % | HEIGHT: 67 IN | WEIGHT: 193.8 LBS | SYSTOLIC BLOOD PRESSURE: 160 MMHG | BODY MASS INDEX: 30.42 KG/M2

## 2022-07-12 DIAGNOSIS — F17.200 SMOKER: ICD-10-CM

## 2022-07-12 DIAGNOSIS — E11.9 TYPE 2 DIABETES MELLITUS WITHOUT COMPLICATION, WITHOUT LONG-TERM CURRENT USE OF INSULIN (HCC): ICD-10-CM

## 2022-07-12 DIAGNOSIS — I25.10 CORONARY ARTERY DISEASE WITHOUT ANGINA PECTORIS, UNSPECIFIED VESSEL OR LESION TYPE, UNSPECIFIED WHETHER NATIVE OR TRANSPLANTED HEART: ICD-10-CM

## 2022-07-12 DIAGNOSIS — I10 ESSENTIAL HYPERTENSION: ICD-10-CM

## 2022-07-12 DIAGNOSIS — E78.2 MIXED HYPERLIPIDEMIA: ICD-10-CM

## 2022-07-12 PROCEDURE — 99214 OFFICE O/P EST MOD 30 MIN: CPT | Performed by: INTERNAL MEDICINE

## 2022-07-12 PROCEDURE — 3052F HG A1C>EQUAL 8.0%<EQUAL 9.0%: CPT | Performed by: INTERNAL MEDICINE

## 2022-07-12 RX ORDER — EZETIMIBE 10 MG/1
10 TABLET ORAL DAILY
Qty: 90 TABLET | Refills: 3 | Status: SHIPPED | OUTPATIENT
Start: 2022-07-12

## 2022-07-12 RX ORDER — METFORMIN HYDROCHLORIDE 500 MG/1
1000 TABLET, EXTENDED RELEASE ORAL 2 TIMES DAILY
Qty: 120 TABLET | Refills: 8 | Status: SHIPPED | OUTPATIENT
Start: 2022-07-12

## 2022-07-12 RX ORDER — ROSUVASTATIN CALCIUM 40 MG/1
40 TABLET, COATED ORAL DAILY
Qty: 90 TABLET | Refills: 3 | Status: SHIPPED | OUTPATIENT
Start: 2022-07-12

## 2022-07-12 RX ORDER — ATENOLOL 50 MG/1
50 TABLET ORAL DAILY
Qty: 90 TABLET | Refills: 3 | Status: SHIPPED | OUTPATIENT
Start: 2022-07-12

## 2022-07-12 RX ORDER — LOSARTAN POTASSIUM AND HYDROCHLOROTHIAZIDE 12.5; 1 MG/1; MG/1
1 TABLET ORAL DAILY
Qty: 30 TABLET | Refills: 5 | Status: SHIPPED | OUTPATIENT
Start: 2022-07-12 | End: 2022-08-08

## 2022-07-12 RX ORDER — ICOSAPENT ETHYL 500 MG/1
0.5 CAPSULE ORAL 2 TIMES DAILY WITH MEALS
Qty: 240 CAPSULE | Refills: 3 | Status: SHIPPED | OUTPATIENT
Start: 2022-07-12

## 2022-07-12 ASSESSMENT — ENCOUNTER SYMPTOMS
WHEEZING: 0
COUGH: 0
CHEST TIGHTNESS: 0
DIARRHEA: 0
GASTROINTESTINAL NEGATIVE: 1
BLOOD IN STOOL: 0
EYES NEGATIVE: 1
SHORTNESS OF BREATH: 0
CONSTIPATION: 0

## 2022-07-12 NOTE — PROGRESS NOTES
for blood in stool, constipation and diarrhea. No FH of ca colon . Colonoguard  6/22  neg   Endocrine:        Diabetes 2015   Genitourinary: Negative for dysuria, frequency and urgency. No FH of ca prostate    Musculoskeletal: Negative. Skin: Negative for rash. Neurological: Negative for dizziness, tremors, weakness and light-headedness. Psychiatric/Behavioral: Negative for sleep disturbance. The patient is nervous/anxious (under control with Lexapro). Objective:   Physical Exam  Vitals reviewed. Constitutional:       General: He is not in acute distress. Eyes:      Extraocular Movements: Extraocular movements intact. Cardiovascular:      Rate and Rhythm: Normal rate and regular rhythm. Heart sounds: Normal heart sounds. Pulmonary:      Effort: Pulmonary effort is normal.      Breath sounds: Normal breath sounds. Abdominal:      General: There is distension. Palpations: Abdomen is soft. Musculoskeletal:         General: Normal range of motion. Cervical back: Neck supple. Right lower leg: No edema. Left lower leg: No edema. Comments: Foot exam 7/22  Deformities: No  Rashes:  No  Callous:  yes  Edema:  No  Injury:  No  Sensory Deficit:  No  Nails: normal       Skin:     General: Skin is warm. Neurological:      General: No focal deficit present. Mental Status: He is alert and oriented to person, place, and time. Psychiatric:         Mood and Affect: Mood normal.         Behavior: Behavior normal.         Assessment:      Jerod Cohen was seen today for check-up, hyperlipidemia, hypertension and diabetes. Jerod Cohen was seen today for hypertension. Diagnoses and all orders for this visit:    Essential hypertension  BP still up . Adding Hyzaar to Atenolol . Advised low salt diet   -     atenolol (TENORMIN) 50 MG tablet; Take 1 tablet by mouth daily  -     losartan-hydroCHLOROthiazide (HYZAAR) 100-12.5 MG per tablet;  Take 1 tablet by mouth daily  -     Comprehensive Metabolic Panel; Future    Mixed hyperlipidemia  Vascepa added for high TGLs . rechk Fasting Lipids   -     Icosapent Ethyl (VASCEPA) 0.5 g CAPS; Take 0.5 mg by mouth 2 times daily (with meals)  -     rosuvastatin (CRESTOR) 40 MG tablet; Take 1 tablet by mouth daily  -     ezetimibe (ZETIA) 10 MG tablet; Take 1 tablet by mouth daily    Type 2 diabetes mellitus without complication, without long-term current use of insulin ([facility]) last A1C 8.4 . High . Exercise reg . Reduce wt   -     losartan-hydroCHLOROthiazide (HYZAAR) 100-12.5 MG per tablet; Take 1 tablet by mouth daily  -     Comprehensive Metabolic Panel; Future  -     Hemoglobin A1C; Future  -     Lipid Panel; Future  -     PSA, Total and Free; Future  -     HM DIABETES FOOT EXAM  -     Icosapent Ethyl (VASCEPA) 0.5 g CAPS; Take 0.5 mg by mouth 2 times daily (with meals)  -     rosuvastatin (CRESTOR) 40 MG tablet; Take 1 tablet by mouth daily  -     ezetimibe (ZETIA) 10 MG tablet; Take 1 tablet by mouth daily  -     metFORMIN (GLUCOPHAGE-XR) 500 MG extended release tablet; Take 2 tablets by mouth 2 times daily  -     dapagliflozin (FARXIGA) 5 MG tablet; Take 1 tablet by mouth every morning    Coronary artery disease without angina pectoris, unspecified vessel or lesion type, unspecified whether native or transplanted heart  -     atenolol (TENORMIN) 50 MG tablet; Take 1 tablet by mouth daily  -     Lipid Panel; Future  -     Icosapent Ethyl (VASCEPA) 0.5 g CAPS; Take 0.5 mg by mouth 2 times daily (with meals)  -     rosuvastatin (CRESTOR) 40 MG tablet; Take 1 tablet by mouth daily  -     ezetimibe (ZETIA) 10 MG tablet; Take 1 tablet by mouth daily          Anxiety  Better with lexapro 30 mg / d   -     escitalopram (LEXAPRO) 20 MG tablet; Take 1 tablet by mouth daily  -     escitalopram (LEXAPRO) 10 MG tablet; Take 2 tablets by mouth daily        Smoker cautioned again   .   He and his wife are actively trying to quit Bilateral carotid artery disease ([facility] [address] Sees Vascular for q 6 mth                  Plan:      Self Management Goals.    Know which medication is for what condition:   Know side effects of medications, and discuss with doctor   Discuss side effects and instructions on new medications  Know correct dose/frequency of medications  Take medications at the same time each day  Stay current on medication refills  If taking OTC's check with MD/pharmacy first about interactions  Monitor sugars and record time/meal    LDL goal 634 or less  Systolic BP < or equal to 780  Diastolic BP < or equal to 80  Quota system,   Nicoderm and or Chantix   Advised  Flu and Pneumonia Vax  Set targets for weight loss 4 lbs per month  Exercise 3-5 times per week  Keep check of weight

## 2022-08-06 DIAGNOSIS — I10 ESSENTIAL HYPERTENSION: ICD-10-CM

## 2022-08-06 DIAGNOSIS — E11.9 TYPE 2 DIABETES MELLITUS WITHOUT COMPLICATION, WITHOUT LONG-TERM CURRENT USE OF INSULIN (HCC): ICD-10-CM

## 2022-08-08 RX ORDER — LOSARTAN POTASSIUM AND HYDROCHLOROTHIAZIDE 12.5; 1 MG/1; MG/1
TABLET ORAL
Qty: 30 TABLET | Refills: 5 | Status: SHIPPED | OUTPATIENT
Start: 2022-08-08

## 2022-08-08 NOTE — TELEPHONE ENCOUNTER
Medication:   Requested Prescriptions     Pending Prescriptions Disp Refills    losartan-hydroCHLOROthiazide (HYZAAR) 100-12.5 MG per tablet [Pharmacy Med Name: Brittni Tinajero 100-12.5 MG TAB] 30 tablet 5     Sig: TAKE 1 TABLET BY MOUTH EVERY DAY        Last Filled:      Patient Phone Number: [phone] (home)     Last appt: 7/12/2022   Next appt: Visit date not found    Last OARRS: No flowsheet data found.

## 2022-08-19 ENCOUNTER — TELEPHONE (OUTPATIENT)
Dept: ADMINISTRATIVE | Age: 62
End: 2022-08-19

## 2022-08-19 NOTE — TELEPHONE ENCOUNTER
Submitted PA for Vascepa 0.5GM capsules, Key: [id number]. Approved, Coverage Starts on: 8/19/2022 12:00:00 AM, Coverage Ends on: 8/19/2023. Please notify patient. Thank you.

## 2022-10-07 DIAGNOSIS — F41.9 ANXIETY: ICD-10-CM

## 2022-10-07 RX ORDER — ALPRAZOLAM 1 MG/1
1 TABLET ORAL 3 TIMES DAILY PRN
Qty: 30 TABLET | Refills: 3 | Status: SHIPPED | OUTPATIENT
Start: 2022-10-07 | End: 2022-11-06

## 2022-10-10 ENCOUNTER — TELEPHONE (OUTPATIENT)
Dept: PRIMARY CARE CLINIC | Age: 62
End: 2022-10-10

## 2022-10-10 ENCOUNTER — PATIENT MESSAGE (OUTPATIENT)
Dept: PRIMARY CARE CLINIC | Age: 62
End: 2022-10-10

## 2022-10-10 DIAGNOSIS — I10 ESSENTIAL HYPERTENSION: ICD-10-CM

## 2022-10-10 DIAGNOSIS — E11.9 TYPE 2 DIABETES MELLITUS WITHOUT COMPLICATION, WITHOUT LONG-TERM CURRENT USE OF INSULIN (HCC): ICD-10-CM

## 2022-10-10 RX ORDER — FLUTICASONE PROPIONATE 50 MCG
1 SPRAY, SUSPENSION (ML) NASAL 2 TIMES DAILY
Qty: 16 G | Refills: 1 | Status: SHIPPED | OUTPATIENT
Start: 2022-10-10

## 2022-10-10 RX ORDER — ALPRAZOLAM 1 MG/1
TABLET ORAL
Qty: 30 TABLET | OUTPATIENT
Start: 2022-10-10

## 2022-10-10 RX ORDER — FEXOFENADINE HCL 180 MG/1
180 TABLET ORAL DAILY
Qty: 30 TABLET | Refills: 0 | Status: SHIPPED | OUTPATIENT
Start: 2022-10-10 | End: 2022-11-09

## 2022-10-10 RX ORDER — ALPRAZOLAM 1 MG/1
1 TABLET ORAL 3 TIMES DAILY PRN
Qty: 30 TABLET | Refills: 3 | OUTPATIENT
Start: 2022-10-10 | End: 2022-11-09

## 2022-10-10 NOTE — TELEPHONE ENCOUNTER
Patient is taking sinus medication for people high BP for head congestion; patient want to know if it is ok to take otc dramamine medicine or medication patch with all his other medication; please advise with a call back to patient @ [phone].

## 2022-10-10 NOTE — TELEPHONE ENCOUNTER
Madeline Boyd MD  You 4 minutes ago (4:56 PM)     NH  That's an antihiatamine .  Ok to take     May make you sleepy     Attempted to call pt, no answer- mailbox full

## 2022-10-10 NOTE — TELEPHONE ENCOUNTER
From: Alisa Ch  To: Dr. Huang Spotted: 10/10/2022 3:08 PM EDT  Subject: Sick    We are in [place] and Ive become sick. Light is intinsified. I cannot stand up with getting very dizzy and vision turning grey. My head/sinuses are extremely clogged. I cant even blow my nose. I have used a nasal cleanse and Afron. What do you suggest or can you call something in for me.  If you do, the closest pharmacy is [facility] in [place].

## 2022-10-11 NOTE — TELEPHONE ENCOUNTER
Elijah Callejas MD  You 17 hours ago (5:05 PM)     NH  Flonase + Taylor Irene MD  You 17 hours ago (5:02 PM)     NH  Last time BP checked 7/22 ; it was high! Advise F/U for that     Stop Afrin     Use Flonase .       Msg sent to pt'

## 2022-10-11 NOTE — TELEPHONE ENCOUNTER
Pt wife was advised and expressed understanding.   Pt wife states pt' was also taking Corcidin HP- was advised to stop and take the prescribed meds

## 2022-10-11 NOTE — TELEPHONE ENCOUNTER
PT's daughter Alejandra Reddy called in regarding the message from yesterday. She says that PT is still experiencing dizziness, nausea, his head feels tight due to congestion and he's unable to blow his nose. Symptoms started Sunday afternoon and he has taken dramamine, flonase and Afrin. She would like to know if there is anything else the PT can take.      Best call back number: [phone]

## 2022-10-13 ENCOUNTER — PATIENT MESSAGE (OUTPATIENT)
Dept: PRIMARY CARE CLINIC | Age: 62
End: 2022-10-13

## 2022-10-13 DIAGNOSIS — J32.9 SINUSITIS, UNSPECIFIED CHRONICITY, UNSPECIFIED LOCATION: Primary | ICD-10-CM

## 2022-10-13 DIAGNOSIS — J32.9 SINUSITIS, UNSPECIFIED CHRONICITY, UNSPECIFIED LOCATION: ICD-10-CM

## 2022-10-13 RX ORDER — AZITHROMYCIN 250 MG/1
250 TABLET, FILM COATED ORAL SEE ADMIN INSTRUCTIONS
Qty: 6 TABLET | Refills: 0 | Status: SHIPPED | OUTPATIENT
Start: 2022-10-13 | End: 2022-10-18

## 2022-10-13 RX ORDER — AZITHROMYCIN 250 MG/1
250 TABLET, FILM COATED ORAL SEE ADMIN INSTRUCTIONS
Qty: 6 TABLET | Refills: 0 | Status: SHIPPED | OUTPATIENT
Start: 2022-10-13 | End: 2022-10-13 | Stop reason: SDUPTHER

## 2023-01-12 DIAGNOSIS — F41.9 ANXIETY: ICD-10-CM

## 2023-01-12 RX ORDER — ESCITALOPRAM OXALATE 10 MG/1
TABLET ORAL
Qty: 90 TABLET | Refills: 3 | Status: SHIPPED | OUTPATIENT
Start: 2023-01-12

## 2023-01-12 NOTE — TELEPHONE ENCOUNTER
Medication:   Requested Prescriptions     Pending Prescriptions Disp Refills    escitalopram (LEXAPRO) 10 MG tablet [Pharmacy Med Name: ESCITALOPRAM 10 MG TABLET] 90 tablet 3     Sig: TAKE 1 TABLET BY MOUTH EVERY DAY        Last Filled:      Patient Phone Number: [phone] (home)     Last appt: 7/12/2022   Next appt: Visit date not found    Last OARRS: No flowsheet data found.

## 2023-02-03 DIAGNOSIS — E11.9 TYPE 2 DIABETES MELLITUS WITHOUT COMPLICATION, WITHOUT LONG-TERM CURRENT USE OF INSULIN (HCC): ICD-10-CM

## 2023-02-03 DIAGNOSIS — I10 ESSENTIAL HYPERTENSION: ICD-10-CM

## 2023-02-03 RX ORDER — LOSARTAN POTASSIUM AND HYDROCHLOROTHIAZIDE 12.5; 1 MG/1; MG/1
TABLET ORAL
Qty: 90 TABLET | Refills: 1 | Status: SHIPPED | OUTPATIENT
Start: 2023-02-03

## 2023-02-03 NOTE — TELEPHONE ENCOUNTER
Medication:   Requested Prescriptions     Pending Prescriptions Disp Refills    losartan-hydroCHLOROthiazide (HYZAAR) 100-12.5 MG per tablet [Pharmacy Med Name: Alirio Nolan 100-12.5 MG TAB] 90 tablet 1     Sig: TAKE 1 TABLET BY MOUTH EVERY DAY        Last Filled:      Patient Phone Number: [phone] (home)     Last appt: 7/12/2022   Next appt: Visit date not found    Last OARRS: No flowsheet data found.

## 2023-04-01 DIAGNOSIS — I25.10 CORONARY ARTERY DISEASE WITHOUT ANGINA PECTORIS, UNSPECIFIED VESSEL OR LESION TYPE, UNSPECIFIED WHETHER NATIVE OR TRANSPLANTED HEART: ICD-10-CM

## 2023-04-03 RX ORDER — CLOPIDOGREL BISULFATE 75 MG/1
TABLET ORAL
Qty: 90 TABLET | Refills: 2 | Status: SHIPPED | OUTPATIENT
Start: 2023-04-03

## 2023-04-03 NOTE — TELEPHONE ENCOUNTER
Medication:   Requested Prescriptions     Pending Prescriptions Disp Refills    clopidogrel (PLAVIX) 75 MG tablet [Pharmacy Med Name: CLOPIDOGREL 75 MG TABLET] 90 tablet 2     Sig: TAKE 1 TABLET BY MOUTH EVERY DAY        Last Filled:      Patient Phone Number: [phone] (home)     Last appt: 7/12/2022   Next appt: Visit date not found    Last OARRS: No flowsheet data found.

## 2023-05-13 DIAGNOSIS — I10 ESSENTIAL HYPERTENSION: ICD-10-CM

## 2023-05-13 DIAGNOSIS — E11.9 TYPE 2 DIABETES MELLITUS WITHOUT COMPLICATION, WITHOUT LONG-TERM CURRENT USE OF INSULIN (HCC): ICD-10-CM

## 2023-05-15 NOTE — TELEPHONE ENCOUNTER
Medication:   Requested Prescriptions     Pending Prescriptions Disp Refills    losartan-hydroCHLOROthiazide (HYZAAR) 100-12.5 MG per tablet [Pharmacy Med Name: Annette Bradshaw 100-12.5 MG TAB] 90 tablet 1     Sig: TAKE 1 TABLET BY MOUTH EVERY DAY        Last Filled:      Patient Phone Number: [phone] (home)     Last appt: 7/12/2022   Next appt: Visit date not found    Last OARRS: No flowsheet data found.

## 2023-05-18 RX ORDER — LOSARTAN POTASSIUM AND HYDROCHLOROTHIAZIDE 12.5; 1 MG/1; MG/1
TABLET ORAL
Qty: 90 TABLET | Refills: 1 | Status: SHIPPED | OUTPATIENT
Start: 2023-05-18

## 2023-05-20 DIAGNOSIS — E11.9 TYPE 2 DIABETES MELLITUS WITHOUT COMPLICATION, WITHOUT LONG-TERM CURRENT USE OF INSULIN (HCC): ICD-10-CM

## 2023-05-22 NOTE — TELEPHONE ENCOUNTER
Medication:   Requested Prescriptions     Pending Prescriptions Disp Refills    metFORMIN (GLUCOPHAGE-XR) 500 MG extended release tablet [Pharmacy Med Name: METFORMIN HCL  MG TABLET] 360 tablet 2     Sig: TAKE 2 TABLETS BY MOUTH TWICE A DAY        Last Filled:      Patient Phone Number: [phone] (home)     Last appt: 7/12/2022   Next appt: Visit date not found    Last OARRS: No flowsheet data found.

## 2023-05-23 RX ORDER — METFORMIN HYDROCHLORIDE 500 MG/1
TABLET, EXTENDED RELEASE ORAL
Qty: 360 TABLET | Refills: 2 | Status: SHIPPED | OUTPATIENT
Start: 2023-05-23

## 2023-05-29 DIAGNOSIS — E11.9 TYPE 2 DIABETES MELLITUS WITHOUT COMPLICATION, WITHOUT LONG-TERM CURRENT USE OF INSULIN (HCC): ICD-10-CM

## 2023-05-29 DIAGNOSIS — I25.10 CORONARY ARTERY DISEASE WITHOUT ANGINA PECTORIS, UNSPECIFIED VESSEL OR LESION TYPE, UNSPECIFIED WHETHER NATIVE OR TRANSPLANTED HEART: ICD-10-CM

## 2023-05-31 ENCOUNTER — PROCEDURE VISIT (OUTPATIENT)
Dept: VASCULAR SURGERY | Age: 63
End: 2023-05-31
Payer: COMMERCIAL

## 2023-05-31 DIAGNOSIS — I65.23 BILATERAL CAROTID ARTERY STENOSIS: Primary | ICD-10-CM

## 2023-05-31 PROCEDURE — 93880 EXTRACRANIAL BILAT STUDY: CPT | Performed by: SURGERY

## 2023-05-31 RX ORDER — ASPIRIN 81 MG/1
TABLET, COATED ORAL
Qty: 90 TABLET | Refills: 5 | Status: SHIPPED | OUTPATIENT
Start: 2023-05-31

## 2023-05-31 NOTE — TELEPHONE ENCOUNTER
Medication:   Requested Prescriptions     Pending Prescriptions Disp Refills    ASPIRIN LOW DOSE 81 MG EC tablet [Pharmacy Med Name: ASPIRIN EC 81 MG TABLET] 90 tablet 5     Sig: TAKE 1 TABLET BY MOUTH EVERY DAY        Last Filled:      Patient Phone Number: [phone] (home)     Last appt: 7/12/2022   Next appt: 6/5/2023    Last OARRS: No flowsheet data found.

## 2023-06-05 ENCOUNTER — TELEPHONE (OUTPATIENT)
Dept: VASCULAR SURGERY | Age: 63
End: 2023-06-05

## 2023-06-05 ENCOUNTER — OFFICE VISIT (OUTPATIENT)
Dept: PRIMARY CARE CLINIC | Age: 63
End: 2023-06-05
Payer: COMMERCIAL

## 2023-06-05 VITALS
OXYGEN SATURATION: 97 % | DIASTOLIC BLOOD PRESSURE: 63 MMHG | HEART RATE: 64 BPM | WEIGHT: 200.8 LBS | BODY MASS INDEX: 31.52 KG/M2 | SYSTOLIC BLOOD PRESSURE: 104 MMHG | HEIGHT: 67 IN | TEMPERATURE: 98.5 F

## 2023-06-05 DIAGNOSIS — E78.1 HYPERTRIGLYCERIDEMIA: ICD-10-CM

## 2023-06-05 DIAGNOSIS — I65.23 BILATERAL CAROTID ARTERY STENOSIS: Primary | ICD-10-CM

## 2023-06-05 DIAGNOSIS — E11.9 TYPE 2 DIABETES MELLITUS WITHOUT COMPLICATION, WITHOUT LONG-TERM CURRENT USE OF INSULIN (HCC): Primary | ICD-10-CM

## 2023-06-05 DIAGNOSIS — I25.10 CORONARY ARTERY DISEASE WITHOUT ANGINA PECTORIS, UNSPECIFIED VESSEL OR LESION TYPE, UNSPECIFIED WHETHER NATIVE OR TRANSPLANTED HEART: ICD-10-CM

## 2023-06-05 DIAGNOSIS — I65.23 CAROTID ATHEROSCLEROSIS, BILATERAL: ICD-10-CM

## 2023-06-05 DIAGNOSIS — F17.200 SMOKER: ICD-10-CM

## 2023-06-05 DIAGNOSIS — I10 ESSENTIAL HYPERTENSION: ICD-10-CM

## 2023-06-05 DIAGNOSIS — F41.9 ANXIETY: ICD-10-CM

## 2023-06-05 DIAGNOSIS — E78.2 MIXED HYPERLIPIDEMIA: ICD-10-CM

## 2023-06-05 LAB — HBA1C MFR BLD: 8.2 %

## 2023-06-05 PROCEDURE — 83036 HEMOGLOBIN GLYCOSYLATED A1C: CPT | Performed by: INTERNAL MEDICINE

## 2023-06-05 PROCEDURE — 3074F SYST BP LT 130 MM HG: CPT | Performed by: INTERNAL MEDICINE

## 2023-06-05 PROCEDURE — 99214 OFFICE O/P EST MOD 30 MIN: CPT | Performed by: INTERNAL MEDICINE

## 2023-06-05 PROCEDURE — 3052F HG A1C>EQUAL 8.0%<EQUAL 9.0%: CPT | Performed by: INTERNAL MEDICINE

## 2023-06-05 PROCEDURE — 3078F DIAST BP <80 MM HG: CPT | Performed by: INTERNAL MEDICINE

## 2023-06-05 RX ORDER — ESCITALOPRAM OXALATE 10 MG/1
10 TABLET ORAL DAILY
Qty: 90 TABLET | Refills: 3 | Status: SHIPPED | OUTPATIENT
Start: 2023-06-05

## 2023-06-05 RX ORDER — ICOSAPENT ETHYL 500 MG/1
0.5 CAPSULE ORAL 2 TIMES DAILY WITH MEALS
Qty: 240 CAPSULE | Refills: 3 | Status: SHIPPED | OUTPATIENT
Start: 2023-06-05

## 2023-06-05 RX ORDER — EZETIMIBE 10 MG/1
10 TABLET ORAL DAILY
Qty: 90 TABLET | Refills: 3 | Status: SHIPPED | OUTPATIENT
Start: 2023-06-05

## 2023-06-05 RX ORDER — LOSARTAN POTASSIUM AND HYDROCHLOROTHIAZIDE 12.5; 1 MG/1; MG/1
1 TABLET ORAL DAILY
Qty: 90 TABLET | Refills: 3 | Status: SHIPPED | OUTPATIENT
Start: 2023-06-05

## 2023-06-05 RX ORDER — ESCITALOPRAM OXALATE 20 MG/1
20 TABLET ORAL DAILY
Qty: 90 TABLET | Refills: 4 | Status: SHIPPED | OUTPATIENT
Start: 2023-06-05

## 2023-06-05 RX ORDER — CLOPIDOGREL BISULFATE 75 MG/1
75 TABLET ORAL DAILY
Qty: 90 TABLET | Refills: 3 | Status: SHIPPED | OUTPATIENT
Start: 2023-06-05

## 2023-06-05 RX ORDER — METFORMIN HYDROCHLORIDE 500 MG/1
1000 TABLET, EXTENDED RELEASE ORAL 2 TIMES DAILY
Qty: 360 TABLET | Refills: 2 | Status: SHIPPED | OUTPATIENT
Start: 2023-06-05

## 2023-06-05 RX ORDER — ASPIRIN 81 MG/1
81 TABLET ORAL DAILY
Qty: 90 TABLET | Refills: 5 | Status: SHIPPED | OUTPATIENT
Start: 2023-06-05

## 2023-06-05 RX ORDER — ATENOLOL 50 MG/1
50 TABLET ORAL DAILY
Qty: 90 TABLET | Refills: 3 | Status: SHIPPED | OUTPATIENT
Start: 2023-06-05

## 2023-06-05 RX ORDER — ROSUVASTATIN CALCIUM 40 MG/1
40 TABLET, COATED ORAL DAILY
Qty: 90 TABLET | Refills: 3 | Status: SHIPPED | OUTPATIENT
Start: 2023-06-05

## 2023-06-05 SDOH — ECONOMIC STABILITY: HOUSING INSECURITY
IN THE LAST 12 MONTHS, WAS THERE A TIME WHEN YOU DID NOT HAVE A STEADY PLACE TO SLEEP OR SLEPT IN A SHELTER (INCLUDING NOW)?: PATIENT REFUSED

## 2023-06-05 SDOH — ECONOMIC STABILITY: FOOD INSECURITY: WITHIN THE PAST 12 MONTHS, YOU WORRIED THAT YOUR FOOD WOULD RUN OUT BEFORE YOU GOT MONEY TO BUY MORE.: PATIENT DECLINED

## 2023-06-05 SDOH — ECONOMIC STABILITY: FOOD INSECURITY: WITHIN THE PAST 12 MONTHS, THE FOOD YOU BOUGHT JUST DIDN'T LAST AND YOU DIDN'T HAVE MONEY TO GET MORE.: PATIENT DECLINED

## 2023-06-05 SDOH — ECONOMIC STABILITY: FOOD INSECURITY: WITHIN THE PAST 12 MONTHS, YOU WORRIED THAT YOUR FOOD WOULD RUN OUT BEFORE YOU GOT MONEY TO BUY MORE.: NEVER TRUE

## 2023-06-05 SDOH — ECONOMIC STABILITY: HOUSING INSECURITY
IN THE LAST 12 MONTHS, WAS THERE A TIME WHEN YOU DID NOT HAVE A STEADY PLACE TO SLEEP OR SLEPT IN A SHELTER (INCLUDING NOW)?: NO

## 2023-06-05 SDOH — ECONOMIC STABILITY: INCOME INSECURITY: HOW HARD IS IT FOR YOU TO PAY FOR THE VERY BASICS LIKE FOOD, HOUSING, MEDICAL CARE, AND HEATING?: NOT HARD AT ALL

## 2023-06-05 SDOH — ECONOMIC STABILITY: INCOME INSECURITY: HOW HARD IS IT FOR YOU TO PAY FOR THE VERY BASICS LIKE FOOD, HOUSING, MEDICAL CARE, AND HEATING?: PATIENT DECLINED

## 2023-06-05 SDOH — ECONOMIC STABILITY: FOOD INSECURITY: WITHIN THE PAST 12 MONTHS, THE FOOD YOU BOUGHT JUST DIDN'T LAST AND YOU DIDN'T HAVE MONEY TO GET MORE.: NEVER TRUE

## 2023-06-05 ASSESSMENT — PATIENT HEALTH QUESTIONNAIRE - PHQ9
SUM OF ALL RESPONSES TO PHQ9 QUESTIONS 1 & 2: 0
2. FEELING DOWN, DEPRESSED OR HOPELESS: NOT AT ALL
SUM OF ALL RESPONSES TO PHQ QUESTIONS 1-9: 0
1. LITTLE INTEREST OR PLEASURE IN DOING THINGS: 0
SUM OF ALL RESPONSES TO PHQ9 QUESTIONS 1 & 2: 0
1. LITTLE INTEREST OR PLEASURE IN DOING THINGS: NOT AT ALL
2. FEELING DOWN, DEPRESSED OR HOPELESS: 0
SUM OF ALL RESPONSES TO PHQ QUESTIONS 1-9: 0

## 2023-06-05 ASSESSMENT — ENCOUNTER SYMPTOMS
CONSTIPATION: 0
WHEEZING: 0
COUGH: 0
EYES NEGATIVE: 1
CHEST TIGHTNESS: 0
SHORTNESS OF BREATH: 0
GASTROINTESTINAL NEGATIVE: 1
BLOOD IN STOOL: 0
DIARRHEA: 0

## 2023-06-05 NOTE — TELEPHONE ENCOUNTER
Left message with results of carotid duplex which is stable. Plan to repeat in 1 year.     Electronically signed by ANITA Gifford CNP on 6/5/2023 at 2:02 PM

## 2023-06-05 NOTE — PROGRESS NOTES
Subjective:      Patient ID: Windy Barber is a 63 y.o. male. 6/5/2023 Patient presents with:  Hypertension  Diabetes              Last seen   7/12/2022 Patient presents with:  Hypertension               5/10/2022 Patient presents with:  Check-Up  Hyperlipidemia  Hypertension  Diabetes        . Carotid atherosclerosis, bilateral  S/p right carotid endarterectomy. 10/21    Recommend follow-up carotid duplex scan in 6 months. - VL DUP CAROTID BILATERAL; Future              VV  7/29/2020 Patient presents with: Follow-Up from [address] 7/20/20  DOD 7/21/20    Windy Barber was admitted with chest pain. He had been at [facility] over the weekend after presenting with a NSTEMI but left b/e severe anxiety. He underwent LHC on 7/20/2020 with resultant KUMAR to LAD. He has severe PAD with infrarenal aortic occlusion and CAD with prior PCI       Last seen by me  1/9/2020       S/P Stents . 8/11    Still smokes ! .    H/O chronic anxiety . Hypertension  Pertinent negatives include no shortness of breath. Diabetes  Hypoglycemia symptoms include nervousness/anxiousness (under control with Lexapro). Pertinent negatives for hypoglycemia include no dizziness or tremors. Pertinent negatives for diabetes include no fatigue and no weakness. Hyperlipidemia  Pertinent negatives include no shortness of breath. Review of Systems   Constitutional:  Negative for chills, fatigue and fever. . Refuses Flu vac     tdap 8/18     Pneum vac   5/22 ; 8/18     Shingrex/  5/22 complete     covid vac 3 rd booster 2022   HENT:          Dental Exam  Reg    Eyes: Negative. Eye Ex  1/21   Respiratory:  Negative for cough, chest tightness, shortness of breath and wheezing. Smoking 4 cig / d         Was Smoking  1/2 ppd     . No Etoh . No rec drugs . No asthma . Cardiovascular: Negative. Known CAD  ;S/P Stents 8/11 .      Last exam 5/23   with Vascular Dr  For Carotid art scan     Walks reg , no

## 2023-07-11 DIAGNOSIS — I10 ESSENTIAL HYPERTENSION: ICD-10-CM

## 2023-07-11 DIAGNOSIS — I25.10 CORONARY ARTERY DISEASE WITHOUT ANGINA PECTORIS, UNSPECIFIED VESSEL OR LESION TYPE, UNSPECIFIED WHETHER NATIVE OR TRANSPLANTED HEART: ICD-10-CM

## 2023-07-11 RX ORDER — ATENOLOL 50 MG/1
TABLET ORAL
Qty: 90 TABLET | Refills: 3 | Status: SHIPPED | OUTPATIENT
Start: 2023-07-11

## 2023-07-11 NOTE — TELEPHONE ENCOUNTER
Medication:   Requested Prescriptions     Pending Prescriptions Disp Refills    atenolol (TENORMIN) 50 MG tablet [Pharmacy Med Name: ATENOLOL 50 MG TABLET] 90 tablet 3     Sig: TAKE 1 TABLET BY MOUTH EVERY DAY        Last Filled:      Patient Phone Number: [phone] (home)     Last appt: 6/5/2023   Next appt: Visit date not found    Last OARRS: No flowsheet data found.

## 2023-08-07 ENCOUNTER — TELEPHONE (OUTPATIENT)
Dept: ADMINISTRATIVE | Age: 63
End: 2023-08-07

## 2023-08-07 NOTE — TELEPHONE ENCOUNTER
Submitted PA for VASCEPA  Via [facility] Key: [id number] STATUS: Available without authorization. Follow up done daily; if no response in three days we will refax for status check. If another three days goes by with no response we will call the insurance for status.

## 2023-10-27 ENCOUNTER — OFFICE VISIT (OUTPATIENT)
Dept: PRIMARY CARE CLINIC | Age: 63
End: 2023-10-27
Payer: COMMERCIAL

## 2023-10-27 VITALS
WEIGHT: 195.6 LBS | DIASTOLIC BLOOD PRESSURE: 70 MMHG | OXYGEN SATURATION: 98 % | HEIGHT: 67 IN | TEMPERATURE: 98.4 F | HEART RATE: 76 BPM | BODY MASS INDEX: 30.7 KG/M2 | SYSTOLIC BLOOD PRESSURE: 120 MMHG

## 2023-10-27 DIAGNOSIS — R80.9 ASYMPTOMATIC PROTEINURIA: ICD-10-CM

## 2023-10-27 DIAGNOSIS — E11.9 TYPE 2 DIABETES MELLITUS WITHOUT COMPLICATION, WITHOUT LONG-TERM CURRENT USE OF INSULIN (HCC): ICD-10-CM

## 2023-10-27 DIAGNOSIS — E78.5 HYPERLIPIDEMIA LDL GOAL <70: ICD-10-CM

## 2023-10-27 DIAGNOSIS — Z00.00 PHYSICAL EXAM: Primary | ICD-10-CM

## 2023-10-27 DIAGNOSIS — Z29.11 NEED FOR RSV VACCINATION: ICD-10-CM

## 2023-10-27 DIAGNOSIS — I25.10 CORONARY ARTERY DISEASE WITHOUT ANGINA PECTORIS, UNSPECIFIED VESSEL OR LESION TYPE, UNSPECIFIED WHETHER NATIVE OR TRANSPLANTED HEART: ICD-10-CM

## 2023-10-27 DIAGNOSIS — N52.9 ERECTILE DYSFUNCTION, UNSPECIFIED ERECTILE DYSFUNCTION TYPE: ICD-10-CM

## 2023-10-27 DIAGNOSIS — Z23 NEED FOR COVID-19 VACCINE: ICD-10-CM

## 2023-10-27 DIAGNOSIS — R79.89 LOW TESTOSTERONE: ICD-10-CM

## 2023-10-27 DIAGNOSIS — I10 ESSENTIAL HYPERTENSION: ICD-10-CM

## 2023-10-27 DIAGNOSIS — N28.9 RENAL INSUFFICIENCY: ICD-10-CM

## 2023-10-27 DIAGNOSIS — Z23 FLU VACCINE NEED: ICD-10-CM

## 2023-10-27 LAB — HBA1C MFR BLD: 8.2 %

## 2023-10-27 PROCEDURE — 3074F SYST BP LT 130 MM HG: CPT | Performed by: INTERNAL MEDICINE

## 2023-10-27 PROCEDURE — 83036 HEMOGLOBIN GLYCOSYLATED A1C: CPT | Performed by: INTERNAL MEDICINE

## 2023-10-27 PROCEDURE — 99396 PREV VISIT EST AGE 40-64: CPT | Performed by: INTERNAL MEDICINE

## 2023-10-27 PROCEDURE — 3078F DIAST BP <80 MM HG: CPT | Performed by: INTERNAL MEDICINE

## 2023-10-27 RX ORDER — ICOSAPENT ETHYL 500 MG/1
0.5 CAPSULE ORAL 2 TIMES DAILY WITH MEALS
Qty: 240 CAPSULE | Refills: 3 | Status: SHIPPED | OUTPATIENT
Start: 2023-10-27

## 2023-10-27 RX ORDER — ROSUVASTATIN CALCIUM 40 MG/1
40 TABLET, COATED ORAL DAILY
Qty: 90 TABLET | Refills: 3 | Status: SHIPPED | OUTPATIENT
Start: 2023-10-27

## 2023-10-27 RX ORDER — ATENOLOL 50 MG/1
50 TABLET ORAL DAILY
Qty: 90 TABLET | Refills: 3 | Status: SHIPPED | OUTPATIENT
Start: 2023-10-27

## 2023-10-27 RX ORDER — METFORMIN HYDROCHLORIDE 500 MG/1
1000 TABLET, EXTENDED RELEASE ORAL 2 TIMES DAILY
Qty: 360 TABLET | Refills: 2 | Status: SHIPPED | OUTPATIENT
Start: 2023-10-27

## 2023-10-27 RX ORDER — EZETIMIBE 10 MG/1
10 TABLET ORAL DAILY
Qty: 90 TABLET | Refills: 3 | Status: SHIPPED | OUTPATIENT
Start: 2023-10-27

## 2023-10-27 RX ORDER — GLIMEPIRIDE 1 MG/1
1 TABLET ORAL EVERY MORNING
Qty: 90 TABLET | Refills: 3 | Status: SHIPPED | OUTPATIENT
Start: 2023-10-27

## 2023-10-27 RX ORDER — LOSARTAN POTASSIUM AND HYDROCHLOROTHIAZIDE 12.5; 1 MG/1; MG/1
1 TABLET ORAL DAILY
Qty: 90 TABLET | Refills: 3 | Status: SHIPPED | OUTPATIENT
Start: 2023-10-27

## 2023-10-27 ASSESSMENT — ENCOUNTER SYMPTOMS
GASTROINTESTINAL NEGATIVE: 1
COUGH: 0
DIARRHEA: 0
BLOOD IN STOOL: 0
SHORTNESS OF BREATH: 0
CONSTIPATION: 0
WHEEZING: 0
EYES NEGATIVE: 1
CHEST TIGHTNESS: 0

## 2023-10-27 NOTE — PROGRESS NOTES
. No Etoh . No rec drugs . No asthma . Cardiovascular: Negative. Known CAD  ;S/P Stents 8/11 . Last exam 5/23   with Vascular Dr  For Carotid art scan     Walks reg , no discomfort    Father with CAD at 36 ! Gastrointestinal: Negative. Negative for blood in stool, constipation and diarrhea. No FH of ca colon . Colonoguard  6/22  neg   Endocrine:        Diabetes 2015   Genitourinary:  Negative for dysuria, frequency and urgency. No FH of ca prostate    Musculoskeletal: Negative. Skin:  Negative for rash. Neurological:  Negative for dizziness, tremors, weakness and light-headedness. Psychiatric/Behavioral:  Negative for sleep disturbance. The patient is nervous/anxious (under control with Lexapro). Objective:   Physical Exam  Vitals reviewed. Constitutional:       General: He is not in acute distress. Eyes:      Extraocular Movements: Extraocular movements intact. Cardiovascular:      Rate and Rhythm: Normal rate and regular rhythm. Heart sounds: Normal heart sounds. Pulmonary:      Effort: Pulmonary effort is normal.      Breath sounds: Normal breath sounds. Abdominal:      General: There is distension. Palpations: Abdomen is soft. Musculoskeletal:         General: Normal range of motion. Cervical back: Neck supple. Right lower leg: No edema. Left lower leg: No edema. Comments: Foot exam  6/22        Skin:     General: Skin is warm. Neurological:      General: No focal deficit present. Mental Status: He is alert and oriented to person, place, and time. Psychiatric:         Mood and Affect: Mood normal.         Behavior: Behavior normal.         Assessment:      Silvano Damon was seen today for annual exam and results. Diagnoses and all orders for this visit:    Physical exam   Stable .  Reciewed labs with Silvano Damon in detail     Flu vaccine need    Need for COVID-19 vaccine    Need for RSV vaccination    Type 2

## 2024-01-31 ENCOUNTER — TELEPHONE (OUTPATIENT)
Dept: PRIMARY CARE CLINIC | Age: 64
End: 2024-01-31

## 2024-01-31 NOTE — TELEPHONE ENCOUNTER
Message was routed to Corrie ROMERO . However, PT is looking to establish with Dr. Barrera.     Please assist.       ----- Message from Claudio Baumann sent at 1/30/2024  4:47 PM EST -----  Subject: Appointment Request    Reason for Call: New Patient/New to Provider Appointment needed: New   Patient Request Appointment    QUESTIONS    Reason for appointment request? Requested Provider unavailable - Debbie Barrera, DO     Additional Information for Provider? Pt would like to est care with   requested PCP on the next available appt and when PCP is accepting NPs. Pt   is open to any day and time. Would like to change providers and est care   here.   ---------------------------------------------------------------------------  --------------  CALL BACK INFO  [phone]; OK to leave message on Lala,OK to respond with electronic   message via [employer] portal (only for patients who have registered [employer]   account)  ---------------------------------------------------------------------------  --------------  SCRIPT ANSWERS

## 2024-03-28 ENCOUNTER — TELEPHONE (OUTPATIENT)
Dept: SURGERY | Age: 64
End: 2024-03-28

## 2024-03-28 NOTE — TELEPHONE ENCOUNTER
DEANNA to schedule carotid US in FF office after 5/31/24. Dr. Regalado will call with the results.

## 2024-04-10 ENCOUNTER — TELEPHONE (OUTPATIENT)
Dept: SURGERY | Age: 64
End: 2024-04-10

## 2024-04-10 NOTE — TELEPHONE ENCOUNTER
LM #2 to schedule carotid US in FF office after 5/31/24. Dr. Regalado will call with the results.

## 2024-05-24 DIAGNOSIS — I25.10 CORONARY ARTERY DISEASE WITHOUT ANGINA PECTORIS, UNSPECIFIED VESSEL OR LESION TYPE, UNSPECIFIED WHETHER NATIVE OR TRANSPLANTED HEART: ICD-10-CM

## 2024-05-28 NOTE — TELEPHONE ENCOUNTER
Medication:   Requested Prescriptions     Pending Prescriptions Disp Refills    clopidogrel (PLAVIX) 75 MG tablet [Pharmacy Med Name: CLOPIDOGREL 75 MG TABLET] 90 tablet 3     Sig: TAKE 1 TABLET BY MOUTH EVERY DAY        Last Filled:      Patient Phone Number: [phone] (home)     Last appt: 10/27/2023   Next appt: Visit date not found    Last OARRS:        No data to display

## 2024-05-29 ENCOUNTER — TELEPHONE (OUTPATIENT)
Dept: ADMINISTRATIVE | Age: 64
End: 2024-05-29

## 2024-05-29 NOTE — TELEPHONE ENCOUNTER
Submitted PA for Icosapent Ethyl 0.5GM capsules  Via [facility] Key: [id number]  STATUS: APPROVED; letter attached.  Coverage Starts on: 5/29/2024 12:00:00 AM  Coverage Ends on: 5/29/2025 12:00:00 AM     If this requires a response please respond to the pool ( P MHCX PSC MEDICATION PRE-AUTH).      Thank you please advise patient.

## 2024-05-30 DIAGNOSIS — E11.9 TYPE 2 DIABETES MELLITUS WITHOUT COMPLICATION, WITHOUT LONG-TERM CURRENT USE OF INSULIN (HCC): ICD-10-CM

## 2024-05-30 RX ORDER — METFORMIN HYDROCHLORIDE 500 MG/1
1000 TABLET, EXTENDED RELEASE ORAL 2 TIMES DAILY
Qty: 360 TABLET | Refills: 2 | Status: SHIPPED | OUTPATIENT
Start: 2024-05-30

## 2024-05-30 NOTE — TELEPHONE ENCOUNTER
Medication:   Requested Prescriptions     Pending Prescriptions Disp Refills    metFORMIN (GLUCOPHAGE-XR) 500 MG extended release tablet [Pharmacy Med Name: METFORMIN HCL  MG TABLET] 360 tablet 2     Sig: TAKE 2 TABLETS BY MOUTH TWICE A DAY       Last Filled:      Patient Phone Number: [phone] (home)     Last appt: 10/27/2023   Next appt: Visit date not found    Last Labs DM:   Lab Results   Component Value Date/Time    LABA1C 8.2 10/27/2023 09:46 AM

## 2024-06-05 RX ORDER — CLOPIDOGREL BISULFATE 75 MG/1
75 TABLET ORAL DAILY
Qty: 90 TABLET | Refills: 3 | Status: SHIPPED | OUTPATIENT
Start: 2024-06-05

## 2024-06-11 ENCOUNTER — TELEPHONE (OUTPATIENT)
Dept: VASCULAR SURGERY | Age: 64
End: 2024-06-11

## 2024-06-11 DIAGNOSIS — I65.23 CAROTID ATHEROSCLEROSIS, BILATERAL: Primary | ICD-10-CM

## 2024-08-25 DIAGNOSIS — I25.10 CORONARY ARTERY DISEASE WITHOUT ANGINA PECTORIS, UNSPECIFIED VESSEL OR LESION TYPE, UNSPECIFIED WHETHER NATIVE OR TRANSPLANTED HEART: ICD-10-CM

## 2024-08-25 DIAGNOSIS — E11.9 TYPE 2 DIABETES MELLITUS WITHOUT COMPLICATION, WITHOUT LONG-TERM CURRENT USE OF INSULIN (HCC): ICD-10-CM

## 2024-08-26 RX ORDER — ASPIRIN 81 MG/1
81 TABLET, COATED ORAL DAILY
Qty: 90 TABLET | Refills: 5 | Status: SHIPPED | OUTPATIENT
Start: 2024-08-26

## 2024-08-26 NOTE — TELEPHONE ENCOUNTER
Medication:   Requested Prescriptions     Pending Prescriptions Disp Refills    ASPIRIN LOW DOSE 81 MG EC tablet [Pharmacy Med Name: ASPIRIN EC 81 MG TABLET] 90 tablet 5     Sig: TAKE 1 TABLET BY MOUTH EVERY DAY        Last Filled:      Patient Phone Number: [phone] (home) [phone] (work)    Last appt: 10/27/2023   Next appt: Visit date not found    Last OARRS:        No data to display

## 2024-08-28 DIAGNOSIS — F41.9 ANXIETY: ICD-10-CM

## 2024-08-28 RX ORDER — ESCITALOPRAM OXALATE 10 MG/1
10 TABLET ORAL DAILY
Qty: 90 TABLET | Refills: 3 | Status: SHIPPED | OUTPATIENT
Start: 2024-08-28

## 2024-08-28 RX ORDER — ESCITALOPRAM OXALATE 20 MG/1
20 TABLET ORAL DAILY
Qty: 90 TABLET | Refills: 4 | Status: SHIPPED | OUTPATIENT
Start: 2024-08-28

## 2024-08-28 NOTE — TELEPHONE ENCOUNTER
Medication:   Requested Prescriptions     Pending Prescriptions Disp Refills    escitalopram (LEXAPRO) 20 MG tablet [Pharmacy Med Name: ESCITALOPRAM 20 MG TABLET] 90 tablet 4     Sig: TAKE 1 TABLET BY MOUTH EVERY DAY    escitalopram (LEXAPRO) 10 MG tablet [Pharmacy Med Name: ESCITALOPRAM 10 MG TABLET] 90 tablet 3     Sig: TAKE 1 TABLET BY MOUTH EVERY DAY        Last Filled:      Patient Phone Number: [phone] (home) [phone] (work)    Last appt: 10/27/2023   Next appt: Visit date not found    Last OARRS:        No data to display

## 2024-09-04 NOTE — TELEPHONE ENCOUNTER
From: Sruthi Sommers  To: Dr. Elkins Risk: 10/13/2022 10:54 AM EDT  Subject: Sinus issues     What I have has definitely turned into a sinus infection. Headache, sinus pain, tired, nose hurts. I had this same thing about 2 years ago and Ms Reginaldo Chaparro gave me amoxicillin. Can you please see if shell call me in amoxicillin. Trying to save whats left of our vacation in [place]. [facility] in [place].  Easier to get to.   [id number]
Please advise- pharmacy entered for you to send to
Sent to wrong pharmacy please resend to correct pharmacy in [address] for you to sign
Complete

## 2024-09-06 DIAGNOSIS — E11.9 TYPE 2 DIABETES MELLITUS WITHOUT COMPLICATION, WITHOUT LONG-TERM CURRENT USE OF INSULIN (HCC): ICD-10-CM

## 2024-09-06 DIAGNOSIS — I25.10 CORONARY ARTERY DISEASE WITHOUT ANGINA PECTORIS, UNSPECIFIED VESSEL OR LESION TYPE, UNSPECIFIED WHETHER NATIVE OR TRANSPLANTED HEART: ICD-10-CM

## 2024-09-06 NOTE — TELEPHONE ENCOUNTER
Medication:   Requested Prescriptions     Pending Prescriptions Disp Refills    empagliflozin (JARDIANCE) 10 MG tablet [Pharmacy Med Name: JARDIANCE 10 MG TABLET]  0        Last Filled:  [unfilled]    Patient Phone Number: [phone] (home) [phone] (work)    Last appt: 10/27/2023   Next appt: Visit date not found    Last OARRS:        No data to display

## 2024-09-10 DIAGNOSIS — E11.9 TYPE 2 DIABETES MELLITUS WITHOUT COMPLICATION, WITHOUT LONG-TERM CURRENT USE OF INSULIN (HCC): ICD-10-CM

## 2024-09-10 RX ORDER — GLIMEPIRIDE 1 MG/1
1 TABLET ORAL EVERY MORNING
Qty: 90 TABLET | Refills: 3 | Status: SHIPPED | OUTPATIENT
Start: 2024-09-10

## 2024-10-08 ENCOUNTER — TELEPHONE (OUTPATIENT)
Dept: INTERNAL MEDICINE CLINIC | Age: 64
End: 2024-10-08

## 2024-10-08 NOTE — TELEPHONE ENCOUNTER
----- Message from Stefania BLAS sent at 10/8/2024 12:39 PM EDT -----  Regarding: E[facility] Appointment Request  E[facility] Appointment Request    Patient needs appointment for E[facility] Appointment Type: New to Provider.    Patient Requested Dates(s): Earliest Available Appointment  Patient Requested Time: Anytime   Provider Name:Debbie JOHNSON DO Bruce    Reason for Appointment Request: New Patient - Requested Provider unavailable  --------------------------------------------------------------------------------------------------------------------------    Relationship to Patient: Spouse/Partner: Wife    Call Back Information: OK to leave message on voicemail  Preferred Call Back Number: Phone: [phone] or [phone]

## 2024-10-08 NOTE — TELEPHONE ENCOUNTER
LVM to advise Dr. Barrera is not taking new patients and that we could get him scheduled with a provider who is.

## 2024-11-13 DIAGNOSIS — E78.5 HYPERLIPIDEMIA LDL GOAL <70: ICD-10-CM

## 2024-11-13 DIAGNOSIS — E11.9 TYPE 2 DIABETES MELLITUS WITHOUT COMPLICATION, WITHOUT LONG-TERM CURRENT USE OF INSULIN (HCC): ICD-10-CM

## 2024-11-13 DIAGNOSIS — I25.10 CORONARY ARTERY DISEASE WITHOUT ANGINA PECTORIS, UNSPECIFIED VESSEL OR LESION TYPE, UNSPECIFIED WHETHER NATIVE OR TRANSPLANTED HEART: ICD-10-CM

## 2024-11-13 RX ORDER — ROSUVASTATIN CALCIUM 40 MG/1
40 TABLET, COATED ORAL DAILY
Qty: 90 TABLET | Refills: 3 | Status: SHIPPED | OUTPATIENT
Start: 2024-11-13

## 2024-11-21 DIAGNOSIS — I10 ESSENTIAL HYPERTENSION: ICD-10-CM

## 2024-11-21 DIAGNOSIS — I25.10 CORONARY ARTERY DISEASE WITHOUT ANGINA PECTORIS, UNSPECIFIED VESSEL OR LESION TYPE, UNSPECIFIED WHETHER NATIVE OR TRANSPLANTED HEART: ICD-10-CM

## 2024-11-21 NOTE — TELEPHONE ENCOUNTER
Medication:   Requested Prescriptions     Pending Prescriptions Disp Refills    atenolol (TENORMIN) 50 MG tablet [Pharmacy Med Name: ATENOLOL 50 MG TABLET] 90 tablet 0     Sig: TAKE 1 TABLET BY MOUTH EVERY DAY        Last Filled:      Patient Phone Number: [phone] (home) [phone] (work)    Last appt: 10/27/2023   Next appt: Visit date not found    Last OARRS:        No data to display

## 2024-11-22 RX ORDER — ATENOLOL 50 MG/1
50 TABLET ORAL DAILY
Qty: 90 TABLET | Refills: 0 | Status: SHIPPED | OUTPATIENT
Start: 2024-11-22

## 2024-12-02 DIAGNOSIS — I25.10 CORONARY ARTERY DISEASE WITHOUT ANGINA PECTORIS, UNSPECIFIED VESSEL OR LESION TYPE, UNSPECIFIED WHETHER NATIVE OR TRANSPLANTED HEART: ICD-10-CM

## 2024-12-02 DIAGNOSIS — E78.5 HYPERLIPIDEMIA LDL GOAL <70: ICD-10-CM

## 2024-12-02 DIAGNOSIS — I10 ESSENTIAL HYPERTENSION: ICD-10-CM

## 2024-12-02 DIAGNOSIS — E11.9 TYPE 2 DIABETES MELLITUS WITHOUT COMPLICATION, WITHOUT LONG-TERM CURRENT USE OF INSULIN (HCC): ICD-10-CM

## 2024-12-03 RX ORDER — ATENOLOL 50 MG/1
50 TABLET ORAL DAILY
Qty: 90 TABLET | Refills: 0 | OUTPATIENT
Start: 2024-12-03

## 2024-12-03 RX ORDER — EZETIMIBE 10 MG/1
10 TABLET ORAL DAILY
Qty: 90 TABLET | Refills: 3 | Status: SHIPPED | OUTPATIENT
Start: 2024-12-03

## 2024-12-03 NOTE — TELEPHONE ENCOUNTER
Medication:   Requested Prescriptions     Pending Prescriptions Disp Refills    ezetimibe (ZETIA) 10 MG tablet [Pharmacy Med Name: EZETIMIBE 10 MG TABLET] 90 tablet 3     Sig: TAKE 1 TABLET BY MOUTH EVERY DAY     Refused Prescriptions Disp Refills    atenolol (TENORMIN) 50 MG tablet [Pharmacy Med Name: ATENOLOL 50 MG TABLET] 90 tablet 0     Sig: TAKE 1 TABLET BY MOUTH EVERY DAY        Last Filled:      Patient Phone Number: [phone] (home) [phone] (work)    Last appt: 10/27/2023   Next appt: Visit date not found    Last OARRS:        No data to display

## 2024-12-15 DIAGNOSIS — I10 ESSENTIAL HYPERTENSION: ICD-10-CM

## 2024-12-15 DIAGNOSIS — I25.10 CORONARY ARTERY DISEASE WITHOUT ANGINA PECTORIS, UNSPECIFIED VESSEL OR LESION TYPE, UNSPECIFIED WHETHER NATIVE OR TRANSPLANTED HEART: ICD-10-CM

## 2024-12-15 DIAGNOSIS — F41.9 ANXIETY: ICD-10-CM

## 2024-12-15 RX ORDER — ATENOLOL 50 MG/1
50 TABLET ORAL DAILY
Qty: 90 TABLET | Refills: 0 | Status: CANCELLED | OUTPATIENT
Start: 2024-12-15

## 2024-12-16 RX ORDER — ESCITALOPRAM OXALATE 20 MG/1
20 TABLET ORAL DAILY
Qty: 90 TABLET | Refills: 4 | Status: SHIPPED | OUTPATIENT
Start: 2024-12-16

## 2024-12-16 NOTE — TELEPHONE ENCOUNTER
Medication:   Requested Prescriptions     Pending Prescriptions Disp Refills    escitalopram (LEXAPRO) 20 MG tablet 90 tablet 4     Sig: Take 1 tablet by mouth daily        Last Filled:      Patient Phone Number: [phone] (home) [phone] (work)    Last appt: 10/27/2023   Next appt: 1/3/2025    Last OARRS:        No data to display

## 2025-01-03 ENCOUNTER — OFFICE VISIT (OUTPATIENT)
Dept: PRIMARY CARE CLINIC | Age: 65
End: 2025-01-03
Payer: COMMERCIAL

## 2025-01-03 VITALS
SYSTOLIC BLOOD PRESSURE: 135 MMHG | DIASTOLIC BLOOD PRESSURE: 75 MMHG | WEIGHT: 196 LBS | TEMPERATURE: 98.1 F | HEART RATE: 65 BPM | RESPIRATION RATE: 16 BRPM | BODY MASS INDEX: 30.76 KG/M2 | HEIGHT: 67 IN | OXYGEN SATURATION: 96 %

## 2025-01-03 DIAGNOSIS — Z23 FLU VACCINE NEED: ICD-10-CM

## 2025-01-03 DIAGNOSIS — I25.10 CORONARY ARTERY DISEASE WITHOUT ANGINA PECTORIS, UNSPECIFIED VESSEL OR LESION TYPE, UNSPECIFIED WHETHER NATIVE OR TRANSPLANTED HEART: ICD-10-CM

## 2025-01-03 DIAGNOSIS — Z23 NEED FOR COVID-19 VACCINE: ICD-10-CM

## 2025-01-03 DIAGNOSIS — F17.200 SMOKER: ICD-10-CM

## 2025-01-03 DIAGNOSIS — N28.9 RENAL INSUFFICIENCY: ICD-10-CM

## 2025-01-03 DIAGNOSIS — F41.9 ANXIETY: ICD-10-CM

## 2025-01-03 DIAGNOSIS — Z00.00 PE (PHYSICAL EXAM), ROUTINE: Primary | ICD-10-CM

## 2025-01-03 DIAGNOSIS — R80.9 ASYMPTOMATIC PROTEINURIA: ICD-10-CM

## 2025-01-03 DIAGNOSIS — I10 ESSENTIAL HYPERTENSION: ICD-10-CM

## 2025-01-03 DIAGNOSIS — E11.9 TYPE 2 DIABETES MELLITUS WITHOUT COMPLICATION, WITHOUT LONG-TERM CURRENT USE OF INSULIN (HCC): ICD-10-CM

## 2025-01-03 DIAGNOSIS — Z29.11 NEED FOR RSV VACCINATION: ICD-10-CM

## 2025-01-03 DIAGNOSIS — E78.5 HYPERLIPIDEMIA LDL GOAL <70: ICD-10-CM

## 2025-01-03 LAB — HBA1C MFR BLD: 7.1 %

## 2025-01-03 PROCEDURE — 83036 HEMOGLOBIN GLYCOSYLATED A1C: CPT | Performed by: INTERNAL MEDICINE

## 2025-01-03 PROCEDURE — 99396 PREV VISIT EST AGE 40-64: CPT | Performed by: INTERNAL MEDICINE

## 2025-01-03 PROCEDURE — 3078F DIAST BP <80 MM HG: CPT | Performed by: INTERNAL MEDICINE

## 2025-01-03 PROCEDURE — 3075F SYST BP GE 130 - 139MM HG: CPT | Performed by: INTERNAL MEDICINE

## 2025-01-03 RX ORDER — ESCITALOPRAM OXALATE 10 MG/1
10 TABLET ORAL DAILY
Qty: 90 TABLET | Refills: 3 | Status: SHIPPED | OUTPATIENT
Start: 2025-01-03

## 2025-01-03 RX ORDER — METFORMIN HYDROCHLORIDE 500 MG/1
1000 TABLET, EXTENDED RELEASE ORAL 2 TIMES DAILY
Qty: 360 TABLET | Refills: 2 | Status: SHIPPED | OUTPATIENT
Start: 2025-01-03

## 2025-01-03 RX ORDER — ATENOLOL 50 MG/1
50 TABLET ORAL DAILY
Qty: 90 TABLET | Refills: 0 | Status: SHIPPED | OUTPATIENT
Start: 2025-01-03

## 2025-01-03 RX ORDER — ESCITALOPRAM OXALATE 20 MG/1
20 TABLET ORAL DAILY
Qty: 90 TABLET | Refills: 4 | Status: SHIPPED | OUTPATIENT
Start: 2025-01-03

## 2025-01-03 RX ORDER — GLIMEPIRIDE 1 MG/1
1 TABLET ORAL EVERY MORNING
Qty: 90 TABLET | Refills: 3 | Status: SHIPPED | OUTPATIENT
Start: 2025-01-03 | End: 2025-01-03 | Stop reason: SDUPTHER

## 2025-01-03 RX ORDER — ASPIRIN 81 MG/1
81 TABLET ORAL DAILY
Qty: 90 TABLET | Refills: 5 | Status: SHIPPED | OUTPATIENT
Start: 2025-01-03

## 2025-01-03 RX ORDER — LOSARTAN POTASSIUM AND HYDROCHLOROTHIAZIDE 12.5; 1 MG/1; MG/1
1 TABLET ORAL DAILY
Qty: 90 TABLET | Refills: 3 | Status: SHIPPED | OUTPATIENT
Start: 2025-01-03

## 2025-01-03 RX ORDER — GLIMEPIRIDE 1 MG/1
1 TABLET ORAL EVERY MORNING
Qty: 90 TABLET | Refills: 3 | Status: SHIPPED | OUTPATIENT
Start: 2025-01-03

## 2025-01-03 RX ORDER — ROSUVASTATIN CALCIUM 40 MG/1
40 TABLET, COATED ORAL DAILY
Qty: 90 TABLET | Refills: 3 | Status: SHIPPED | OUTPATIENT
Start: 2025-01-03

## 2025-01-03 RX ORDER — DAPAGLIFLOZIN 10 MG/1
10 TABLET, FILM COATED ORAL EVERY MORNING
Qty: 90 TABLET | Refills: 1 | Status: SHIPPED | OUTPATIENT
Start: 2025-01-03

## 2025-01-03 RX ORDER — ICOSAPENT ETHYL 0.5 G/1
0.5 CAPSULE ORAL 2 TIMES DAILY WITH MEALS
Qty: 240 CAPSULE | Refills: 3 | Status: SHIPPED | OUTPATIENT
Start: 2025-01-03

## 2025-01-03 RX ORDER — EZETIMIBE 10 MG/1
10 TABLET ORAL DAILY
Qty: 90 TABLET | Refills: 3 | Status: SHIPPED | OUTPATIENT
Start: 2025-01-03

## 2025-01-03 RX ORDER — CLOPIDOGREL BISULFATE 75 MG/1
75 TABLET ORAL DAILY
Qty: 90 TABLET | Refills: 3 | Status: SHIPPED | OUTPATIENT
Start: 2025-01-03

## 2025-01-03 SDOH — ECONOMIC STABILITY: FOOD INSECURITY: WITHIN THE PAST 12 MONTHS, YOU WORRIED THAT YOUR FOOD WOULD RUN OUT BEFORE YOU GOT MONEY TO BUY MORE.: NEVER TRUE

## 2025-01-03 SDOH — ECONOMIC STABILITY: FOOD INSECURITY: WITHIN THE PAST 12 MONTHS, THE FOOD YOU BOUGHT JUST DIDN'T LAST AND YOU DIDN'T HAVE MONEY TO GET MORE.: NEVER TRUE

## 2025-01-03 SDOH — ECONOMIC STABILITY: INCOME INSECURITY: HOW HARD IS IT FOR YOU TO PAY FOR THE VERY BASICS LIKE FOOD, HOUSING, MEDICAL CARE, AND HEATING?: NOT HARD AT ALL

## 2025-01-03 ASSESSMENT — ENCOUNTER SYMPTOMS
DIARRHEA: 0
BLOOD IN STOOL: 0
WHEEZING: 0
GASTROINTESTINAL NEGATIVE: 1
EYES NEGATIVE: 1
CHEST TIGHTNESS: 0
SHORTNESS OF BREATH: 0
COUGH: 0
CONSTIPATION: 0

## 2025-01-03 ASSESSMENT — PATIENT HEALTH QUESTIONNAIRE - PHQ9
SUM OF ALL RESPONSES TO PHQ QUESTIONS 1-9: 0
1. LITTLE INTEREST OR PLEASURE IN DOING THINGS: NOT AT ALL

## 2025-01-03 NOTE — PROGRESS NOTES
Subjective:      Patient ID: Jose Francisco Small is a 64 y.o. male.    1/3/2025            10/27/2023 Patient presents with:  Annual Exam  Results: Discuss results from 10/24/2023, also fill out a form clearing for low testosterone levels to have injections at the low T center              6/5/2023 Patient presents with:  Hypertension  Diabetes               7/12/2022 Patient presents with:  Hypertension               5/10/2022 Patient presents with:  Check-Up  Hyperlipidemia  Hypertension  Diabetes        . Carotid atherosclerosis, bilateral  S/p right carotid endarterectomy.10/21    Recommend follow-up carotid duplex scan in 6 months.    - VL DUP CAROTID BILATERAL; Future              VV  7/29/2020 Patient presents with:  Follow-Up from Hospital      DOA 7/20/20  DOD 7/21/20    Jose Francisco Small was admitted with chest pain.      He had been at [facility] over the weekend after presenting with a NSTEMI but left b/e severe anxiety. He underwent LHC on 7/20/2020 with resultant KUMAR to LAD.     He has severe PAD with infrarenal aortic occlusion and CAD with prior PCI       Last seen by me  1/9/2020       S/P Stents .8/11    Still smokes !.    H/O chronic anxiety .    Hypertension  Pertinent negatives include no shortness of breath.   Diabetes  Hypoglycemia symptoms include nervousness/anxiousness (under control with Lexapro). Pertinent negatives for hypoglycemia include no dizziness or tremors. Pertinent negatives for diabetes include no fatigue and no weakness.   Hyperlipidemia  Pertinent negatives include no shortness of breath.       Review of Systems   Constitutional:  Negative for chills, fatigue and fever.        Flu vac     RSV vac     Tdap 8/18     Pneum vac   5/22 ; 8/18     Shingrex/  5/22   COMPLETE     covid vac    3/2022   HENT:          Dental Exam  Reg    Eyes: Negative.         Eye Ex  2024   Respiratory:  Negative for cough, chest tightness, shortness of breath and wheezing.         Smoking 1/22 ppd       Smoking 4

## 2025-02-12 ENCOUNTER — PATIENT MESSAGE (OUTPATIENT)
Dept: PRIMARY CARE CLINIC | Age: 65
End: 2025-02-12

## 2025-02-12 DIAGNOSIS — F41.9 ANXIETY: ICD-10-CM

## 2025-02-17 NOTE — TELEPHONE ENCOUNTER
Medication:   Requested Prescriptions     Pending Prescriptions Disp Refills    ALPRAZolam (XANAX) 1 MG tablet 30 tablet 3     Sig: Take 1 tablet by mouth 3 times daily as needed for Sleep or Anxiety for up to 30 days.        Last Filled:      Patient Phone Number: [phone] (home) [phone] (work)    Last appt: 1/3/2025   Next appt: Visit date not found    Last OARRS:        No data to display

## 2025-02-18 RX ORDER — ALPRAZOLAM 1 MG/1
1 TABLET ORAL 3 TIMES DAILY PRN
Qty: 30 TABLET | Refills: 3 | Status: SHIPPED | OUTPATIENT
Start: 2025-02-18 | End: 2025-03-20

## 2025-02-25 SDOH — ECONOMIC STABILITY: FOOD INSECURITY: WITHIN THE PAST 12 MONTHS, THE FOOD YOU BOUGHT JUST DIDN'T LAST AND YOU DIDN'T HAVE MONEY TO GET MORE.: NEVER TRUE

## 2025-02-25 SDOH — ECONOMIC STABILITY: INCOME INSECURITY: IN THE LAST 12 MONTHS, WAS THERE A TIME WHEN YOU WERE NOT ABLE TO PAY THE MORTGAGE OR RENT ON TIME?: NO

## 2025-02-25 SDOH — ECONOMIC STABILITY: TRANSPORTATION INSECURITY
IN THE PAST 12 MONTHS, HAS LACK OF TRANSPORTATION KEPT YOU FROM MEETINGS, WORK, OR FROM GETTING THINGS NEEDED FOR DAILY LIVING?: NO

## 2025-02-25 SDOH — ECONOMIC STABILITY: FOOD INSECURITY: WITHIN THE PAST 12 MONTHS, YOU WORRIED THAT YOUR FOOD WOULD RUN OUT BEFORE YOU GOT MONEY TO BUY MORE.: NEVER TRUE

## 2025-02-25 SDOH — ECONOMIC STABILITY: TRANSPORTATION INSECURITY
IN THE PAST 12 MONTHS, HAS THE LACK OF TRANSPORTATION KEPT YOU FROM MEDICAL APPOINTMENTS OR FROM GETTING MEDICATIONS?: NO

## 2025-02-26 ENCOUNTER — PATIENT MESSAGE (OUTPATIENT)
Dept: INTERNAL MEDICINE CLINIC | Age: 65
End: 2025-02-26

## 2025-02-26 ENCOUNTER — OFFICE VISIT (OUTPATIENT)
Dept: INTERNAL MEDICINE CLINIC | Age: 65
End: 2025-02-26

## 2025-02-26 VITALS
OXYGEN SATURATION: 94 % | HEIGHT: 67 IN | HEART RATE: 61 BPM | SYSTOLIC BLOOD PRESSURE: 138 MMHG | DIASTOLIC BLOOD PRESSURE: 78 MMHG | WEIGHT: 194.4 LBS | BODY MASS INDEX: 30.51 KG/M2

## 2025-02-26 DIAGNOSIS — E11.9 TYPE 2 DIABETES MELLITUS WITHOUT COMPLICATION, WITHOUT LONG-TERM CURRENT USE OF INSULIN (HCC): Primary | ICD-10-CM

## 2025-02-26 DIAGNOSIS — I10 PRIMARY HYPERTENSION: ICD-10-CM

## 2025-02-26 DIAGNOSIS — I87.1 SUBCLAVIAN VEIN STENOSIS, LEFT: ICD-10-CM

## 2025-02-26 DIAGNOSIS — E78.5 HYPERLIPIDEMIA LDL GOAL <70: ICD-10-CM

## 2025-02-26 DIAGNOSIS — Z98.61 CAD S/P PERCUTANEOUS CORONARY ANGIOPLASTY: Primary | Chronic | ICD-10-CM

## 2025-02-26 DIAGNOSIS — I65.23 ATHEROSCLEROSIS OF BOTH CAROTID ARTERIES: ICD-10-CM

## 2025-02-26 DIAGNOSIS — I25.10 CAD S/P PERCUTANEOUS CORONARY ANGIOPLASTY: Primary | Chronic | ICD-10-CM

## 2025-02-26 DIAGNOSIS — Z98.61 CAD S/P PERCUTANEOUS CORONARY ANGIOPLASTY: ICD-10-CM

## 2025-02-26 DIAGNOSIS — F41.9 ANXIETY: ICD-10-CM

## 2025-02-26 DIAGNOSIS — E11.9 TYPE 2 DIABETES MELLITUS WITHOUT COMPLICATION, WITHOUT LONG-TERM CURRENT USE OF INSULIN (HCC): ICD-10-CM

## 2025-02-26 DIAGNOSIS — I25.10 CAD S/P PERCUTANEOUS CORONARY ANGIOPLASTY: ICD-10-CM

## 2025-02-26 DIAGNOSIS — Z12.5 PROSTATE CANCER SCREENING: ICD-10-CM

## 2025-02-26 PROBLEM — I21.4 NSTEMI (NON-ST ELEVATED MYOCARDIAL INFARCTION) (HCC): Status: RESOLVED | Noted: 2020-07-20 | Resolved: 2025-02-26

## 2025-02-26 NOTE — PROGRESS NOTES
tablet by mouth every morning 90 tablet 1    nitroGLYCERIN (NITROSTAT) 0.4 MG SL tablet Place 1 tablet under the tongue every 5 minutes as needed for Chest pain up to max of 3 total doses. If no relief after 1 dose, call 911. 25 tablet 3    Multiple Vitamins-Minerals (THERAPEUTIC MULTIVITAMIN-MINERALS) tablet Take 1 tablet by mouth daily       No facility-administered medications prior to visit.       Patient'spast medical history, surgical history, family history, medications,  and allergies  were all reviewed and updated as appropriate today.    Review of Systems    /78 (Site: Right Upper Arm)   Pulse 61   Ht 1.702 m (5' 7\")   Wt 88.2 kg (194 lb 6.4 oz)   SpO2 94%   BMI 30.45 kg/m²   Physical Exam  Vitals and nursing note reviewed.   Constitutional:       Appearance: He is well-developed. He is not toxic-appearing.   HENT:      Head: Normocephalic.      Right Ear: Tympanic membrane, ear canal and external ear normal.      Left Ear: Tympanic membrane, ear canal and external ear normal.      Mouth/Throat:      Pharynx: No oropharyngeal exudate or posterior oropharyngeal erythema.   Eyes:      General: No scleral icterus.     Extraocular Movements: Extraocular movements intact.      Conjunctiva/sclera: Conjunctivae normal.      Pupils: Pupils are equal, round, and reactive to light.   Neck:      Thyroid: No thyroid mass or thyromegaly.      Vascular: No carotid bruit.   Cardiovascular:      Rate and Rhythm: Normal rate and regular rhythm.      Heart sounds: Normal heart sounds. No murmur heard.  Pulmonary:      Effort: Pulmonary effort is normal.      Breath sounds: Normal breath sounds.   Musculoskeletal:      Right lower leg: No edema.      Left lower leg: No edema.   Lymphadenopathy:      Cervical: No cervical adenopathy.   Neurological:      General: No focal deficit present.      Mental Status: He is alert and oriented to person, place, and time.   Psychiatric:         Mood and Affect: Mood normal.     no

## 2025-02-26 NOTE — PATIENT INSTRUCTIONS
Ozempic and Trulicity are weekly shots that are used to treat diabetes. They in several ways, but one of the ways they work is by slowing digestion. As a result, side effects  include nausea, vomiting, and constipation. To help with constipation, I recommend taking Miralax 1 capful daily. The nausea and vomiting sometimes get better the longer you are on the injection but not always. Rare complications include bowel blockages, which is why you need to make sure that your bowels are moving using Miralax, and pancreatitis, which is a very painful inflammation of your pancreas in the left upper side of your abdomen. Both of these conditions can be life threatening.     If you have a history of pancreatitis or a personal or family history of medullary thyroid cancer or MEN syndrome then you cannot take these medications.     Consider Repetha for cholesterol.

## 2025-02-28 NOTE — ASSESSMENT & PLAN NOTE
Under fair control with hemoglobin A1c of 7.1%.  Currently on metformin 1000 mg twice daily, glimepiride 1 mg daily, and Farxiga 10 mg daily.  Consider starting Ozempic due to cardioprotection benefit.  Ideally, we will be able to get him off of glimepiride and wean metformin.  Discussed at length with patient including risks, benefits, and side effects of GLP-1's.  Patient would like to consider this option.   Denies personal or family history of MEN or medullary thyroid cancer.  Denies personal history of pancreatitis.  Reviewed potential side effects of medication, including nausea, vomiting, constipation, diarrhea, and potentially life-threatening conditions including pancreatitis and bowel obstructions.

## 2025-02-28 NOTE — ASSESSMENT & PLAN NOTE
Patient with left subclavian artery stenosis.  Reportedly this was stented in 2011 (although I cannot find a copy of the procedure note).

## 2025-02-28 NOTE — ASSESSMENT & PLAN NOTE
Under fair control on atenolol 50 mg daily and losartan /25 mg daily.  Monitor blood pressure at home.  Consider changing losartan to olmesartan.

## 2025-02-28 NOTE — ASSESSMENT & PLAN NOTE
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  Patient with CEA of right carotid artery 10/14/2021 with Dr. Regalado.  Most recent carotid Dopplers were in May 2023 with 50 to 69% stenosis of the right ICA.

## 2025-02-28 NOTE — ASSESSMENT & PLAN NOTE
Lifelong per patient.  Reports trialing multiple different medications and being seen by psychiatry.  Has done best on Lexapro 30 mg daily with Xanax 1 mg 3 times daily as needed.

## 2025-02-28 NOTE — ASSESSMENT & PLAN NOTE
Currently asymptomatic.  Initially treated on August 2011 with KUMAR placed to mid LAD for 90% stenosis, proximal D1 for 70 to 80% stenosis, mid OM1 (long stent) for 70 to 80% lesion).  Sustained NSTEMI in July 2020 with stent placed to LAD due to 99% lesion in-stent stenosis.  In 2020, he was also noted to have 100% occlusion of right proximal coronary artery with collaterals.  No left main disease.  Currently on Plavix, aspirin, atenolol 50 mg daily, losartan /25 mg daily, Crestor 40 mg daily, Zetia 10 mg daily, Vascepa 0.5 mg twice daily.  Has not followed with cardiology since 2020.

## 2025-02-28 NOTE — ASSESSMENT & PLAN NOTE
Most recently checked in 2023 and was above goal with LDL of 100.  He is already on maximum therapy with Crestor 40 mg daily, Zetia 10 mg daily, and Vascepa 0.5 mg twice daily.  Check lipid panel prior to next visit.  Consider PSK 9.  Consider fenofibrate if triglycerides remain elevated.  Patient likely has familial hyperlipidemia from review of his lipid panels.  Check lipoprotein a with labs in May.

## 2025-03-26 DIAGNOSIS — E11.9 TYPE 2 DIABETES MELLITUS WITHOUT COMPLICATION, WITHOUT LONG-TERM CURRENT USE OF INSULIN: ICD-10-CM

## 2025-03-26 DIAGNOSIS — Z98.61 CAD S/P PERCUTANEOUS CORONARY ANGIOPLASTY: Chronic | ICD-10-CM

## 2025-03-26 DIAGNOSIS — E78.5 HYPERLIPIDEMIA LDL GOAL <70: ICD-10-CM

## 2025-03-26 DIAGNOSIS — I65.23 ATHEROSCLEROSIS OF BOTH CAROTID ARTERIES: ICD-10-CM

## 2025-03-26 DIAGNOSIS — I25.10 CAD S/P PERCUTANEOUS CORONARY ANGIOPLASTY: Chronic | ICD-10-CM

## 2025-03-26 DIAGNOSIS — I87.1 SUBCLAVIAN VEIN STENOSIS, LEFT: ICD-10-CM

## 2025-03-26 RX ORDER — SEMAGLUTIDE 0.68 MG/ML
INJECTION, SOLUTION SUBCUTANEOUS
Qty: 3 ML | Refills: 1 | Status: SHIPPED | OUTPATIENT
Start: 2025-03-26

## 2025-03-26 NOTE — TELEPHONE ENCOUNTER
Last OV: 2/26/2025  Next OV: 5/16/2025    Next appointment due:5/26/2025    Last fill:2/28/2025  Refills:0    OK to fill

## 2025-05-06 ENCOUNTER — TELEPHONE (OUTPATIENT)
Dept: VASCULAR SURGERY | Age: 65
End: 2025-05-06

## 2025-05-06 NOTE — TELEPHONE ENCOUNTER
DEANNA to schedule carotid us in the FF office, after 6/11/25. No appt needed with Dr. Regalado. Patient will be contacted with results.

## 2025-05-14 ENCOUNTER — TELEPHONE (OUTPATIENT)
Dept: INTERNAL MEDICINE CLINIC | Age: 65
End: 2025-05-14

## 2025-05-14 DIAGNOSIS — Z98.61 CAD S/P PERCUTANEOUS CORONARY ANGIOPLASTY: ICD-10-CM

## 2025-05-14 DIAGNOSIS — I87.1 SUBCLAVIAN VEIN STENOSIS, LEFT: ICD-10-CM

## 2025-05-14 DIAGNOSIS — E78.5 HYPERLIPIDEMIA LDL GOAL <70: ICD-10-CM

## 2025-05-14 DIAGNOSIS — E11.9 TYPE 2 DIABETES MELLITUS WITHOUT COMPLICATION, WITHOUT LONG-TERM CURRENT USE OF INSULIN (HCC): ICD-10-CM

## 2025-05-14 DIAGNOSIS — I65.23 ATHEROSCLEROSIS OF BOTH CAROTID ARTERIES: ICD-10-CM

## 2025-05-14 DIAGNOSIS — Z12.5 PROSTATE CANCER SCREENING: ICD-10-CM

## 2025-05-14 DIAGNOSIS — I25.10 CAD S/P PERCUTANEOUS CORONARY ANGIOPLASTY: ICD-10-CM

## 2025-05-14 LAB
ALBUMIN SERPL-MCNC: 4.5 G/DL (ref 3.4–5)
ALBUMIN/GLOB SERPL: 1.8 {RATIO} (ref 1.1–2.2)
ALP SERPL-CCNC: 96 U/L (ref 40–129)
ALT SERPL-CCNC: 43 U/L (ref 10–40)
ANION GAP SERPL CALCULATED.3IONS-SCNC: 12 MMOL/L (ref 3–16)
AST SERPL-CCNC: 32 U/L (ref 15–37)
BASOPHILS # BLD: 0.1 K/UL (ref 0–0.2)
BASOPHILS NFR BLD: 1.3 %
BILIRUB SERPL-MCNC: 0.4 MG/DL (ref 0–1)
BUN SERPL-MCNC: 20 MG/DL (ref 7–20)
CALCIUM SERPL-MCNC: 10 MG/DL (ref 8.3–10.6)
CHLORIDE SERPL-SCNC: 100 MMOL/L (ref 99–110)
CHOLEST SERPL-MCNC: 174 MG/DL (ref 0–199)
CO2 SERPL-SCNC: 24 MMOL/L (ref 21–32)
CREAT SERPL-MCNC: 1.3 MG/DL (ref 0.8–1.3)
CREAT UR-MCNC: 65.1 MG/DL (ref 39–259)
DEPRECATED RDW RBC AUTO: 13.8 % (ref 12.4–15.4)
EOSINOPHIL # BLD: 0.3 K/UL (ref 0–0.6)
EOSINOPHIL NFR BLD: 3.9 %
GFR SERPLBLD CREATININE-BSD FMLA CKD-EPI: 62 ML/MIN/{1.73_M2}
GLUCOSE SERPL-MCNC: 261 MG/DL (ref 70–99)
HCT VFR BLD AUTO: 50.7 % (ref 40.5–52.5)
HDLC SERPL-MCNC: 30 MG/DL (ref 40–60)
HGB BLD-MCNC: 17.5 G/DL (ref 13.5–17.5)
LDLC SERPL CALC-MCNC: ABNORMAL MG/DL
LDLC SERPL-MCNC: 63 MG/DL
LYMPHOCYTES # BLD: 2.3 K/UL (ref 1–5.1)
LYMPHOCYTES NFR BLD: 29.1 %
MCH RBC QN AUTO: 32.3 PG (ref 26–34)
MCHC RBC AUTO-ENTMCNC: 34.5 G/DL (ref 31–36)
MCV RBC AUTO: 93.7 FL (ref 80–100)
MICROALBUMIN UR DL<=1MG/L-MCNC: 3.05 MG/DL
MICROALBUMIN/CREAT UR: 46.9 MG/G (ref 0–30)
MONOCYTES # BLD: 0.6 K/UL (ref 0–1.3)
MONOCYTES NFR BLD: 8 %
NEUTROPHILS # BLD: 4.5 K/UL (ref 1.7–7.7)
NEUTROPHILS NFR BLD: 57.7 %
PLATELET # BLD AUTO: 260 K/UL (ref 135–450)
PMV BLD AUTO: 7.8 FL (ref 5–10.5)
POTASSIUM SERPL-SCNC: 4.6 MMOL/L (ref 3.5–5.1)
PROT SERPL-MCNC: 7 G/DL (ref 6.4–8.2)
PSA SERPL DL<=0.01 NG/ML-MCNC: 0.09 NG/ML (ref 0–4)
RBC # BLD AUTO: 5.41 M/UL (ref 4.2–5.9)
SODIUM SERPL-SCNC: 136 MMOL/L (ref 136–145)
TRIGL SERPL-MCNC: 468 MG/DL (ref 0–150)
TSH SERPL DL<=0.005 MIU/L-ACNC: 1.2 UIU/ML (ref 0.27–4.2)
VLDLC SERPL CALC-MCNC: ABNORMAL MG/DL
WBC # BLD AUTO: 7.8 K/UL (ref 4–11)

## 2025-05-14 NOTE — TELEPHONE ENCOUNTER
I called patient to see if patient he could come in Friday at 12:40. There is an issue with Dr. Barrera's schedule that day.

## 2025-05-15 ENCOUNTER — RESULTS FOLLOW-UP (OUTPATIENT)
Dept: PRIMARY CARE CLINIC | Age: 65
End: 2025-05-15

## 2025-05-15 LAB
EST. AVERAGE GLUCOSE BLD GHB EST-MCNC: 217.3 MG/DL
HBA1C MFR BLD: 9.2 %

## 2025-05-15 NOTE — TELEPHONE ENCOUNTER
Pt called back. He cannot do 1240 tomorrow d/t his work schedule. The 1020 appt has not been cancelled yet but he understands that he cannot be seen at that time. Please call pt to see where else he can be fit in, thanks.

## 2025-05-15 NOTE — TELEPHONE ENCOUNTER
Left VM for patient, he can still come in at 10:20am, but Dr Barrrea may be running behind. Otherwise, Cherelle can call and reschedule for another day next week

## 2025-05-16 ENCOUNTER — TELEPHONE (OUTPATIENT)
Dept: INTERNAL MEDICINE CLINIC | Age: 65
End: 2025-05-16

## 2025-05-16 LAB — LPA SERPL-MCNC: 264 MG/DL

## 2025-05-18 DIAGNOSIS — I10 ESSENTIAL HYPERTENSION: ICD-10-CM

## 2025-05-18 DIAGNOSIS — E11.9 TYPE 2 DIABETES MELLITUS WITHOUT COMPLICATION, WITHOUT LONG-TERM CURRENT USE OF INSULIN (HCC): ICD-10-CM

## 2025-05-18 DIAGNOSIS — I87.1 SUBCLAVIAN VEIN STENOSIS, LEFT: ICD-10-CM

## 2025-05-18 DIAGNOSIS — I25.10 CAD S/P PERCUTANEOUS CORONARY ANGIOPLASTY: Chronic | ICD-10-CM

## 2025-05-18 DIAGNOSIS — I25.10 CORONARY ARTERY DISEASE WITHOUT ANGINA PECTORIS, UNSPECIFIED VESSEL OR LESION TYPE, UNSPECIFIED WHETHER NATIVE OR TRANSPLANTED HEART: ICD-10-CM

## 2025-05-18 DIAGNOSIS — E78.5 HYPERLIPIDEMIA LDL GOAL <70: ICD-10-CM

## 2025-05-18 DIAGNOSIS — I65.23 ATHEROSCLEROSIS OF BOTH CAROTID ARTERIES: ICD-10-CM

## 2025-05-18 DIAGNOSIS — Z98.61 CAD S/P PERCUTANEOUS CORONARY ANGIOPLASTY: Chronic | ICD-10-CM

## 2025-05-19 RX ORDER — ATENOLOL 50 MG/1
50 TABLET ORAL DAILY
Qty: 90 TABLET | Refills: 0 | OUTPATIENT
Start: 2025-05-19

## 2025-05-19 NOTE — TELEPHONE ENCOUNTER
Medication:   Requested Prescriptions     Pending Prescriptions Disp Refills    OZEMPIC, 0.25 OR 0.5 MG/DOSE, 2 MG/3ML SOPN [Pharmacy Med Name: OZEMPIC 0.25-0.5 MG/DOSE PEN]  1     Sig: INJECT 0.25 MG INTO THE SKIN EVERY 7 DAYS FOR 14 DAYS, THEN 0.5 MG EVERY 7 DAYS FOR 28 DAYS.        Last Filled:      Patient Phone Number: [phone] (home) [phone] (work)    Last appt: 2/26/2025   Next appt: 5/23/2025    Last OARRS:        No data to display

## 2025-05-20 RX ORDER — SEMAGLUTIDE 0.68 MG/ML
INJECTION, SOLUTION SUBCUTANEOUS
Qty: 9 ML | Refills: 1 | OUTPATIENT
Start: 2025-05-20

## 2025-05-20 NOTE — TELEPHONE ENCOUNTER
Hemoglobin A1C   Date Value Ref Range Status   05/14/2025 9.2 See comment % Final     Comment:     Comment:  Diagnosis of Diabetes: > or = 6.5%  Increased risk of diabetes (Prediabetes): 5.7-6.4%  Glycemic Control: Nonpregnant Adults: <7.0%                    Pregnant: <6.0%         Is he out of medication?  Has he been taking 0.25 mg or 0.5 mg?  He has an appointment with me on Friday and I may actually consider completely changing medication based on his labs.    Lab Results   Component Value Date    LABA1C 9.2 05/14/2025    LABA1C 7.1 01/03/2025    LABA1C 8.2 10/27/2023     Lab Results   Component Value Date    CREATININE 1.3 05/14/2025

## 2025-05-23 ENCOUNTER — OFFICE VISIT (OUTPATIENT)
Dept: INTERNAL MEDICINE CLINIC | Age: 65
End: 2025-05-23

## 2025-05-23 VITALS
DIASTOLIC BLOOD PRESSURE: 90 MMHG | OXYGEN SATURATION: 96 % | SYSTOLIC BLOOD PRESSURE: 140 MMHG | HEART RATE: 73 BPM | WEIGHT: 194.2 LBS | BODY MASS INDEX: 30.42 KG/M2

## 2025-05-23 DIAGNOSIS — E11.9 TYPE 2 DIABETES MELLITUS WITHOUT COMPLICATION, WITHOUT LONG-TERM CURRENT USE OF INSULIN (HCC): Primary | ICD-10-CM

## 2025-05-23 DIAGNOSIS — E78.5 HYPERLIPIDEMIA LDL GOAL <70: ICD-10-CM

## 2025-05-23 DIAGNOSIS — I10 PRIMARY HYPERTENSION: ICD-10-CM

## 2025-05-23 DIAGNOSIS — E11.65 TYPE 2 DIABETES MELLITUS WITH HYPERGLYCEMIA, WITHOUT LONG-TERM CURRENT USE OF INSULIN (HCC): ICD-10-CM

## 2025-05-23 DIAGNOSIS — I25.10 CAD S/P PERCUTANEOUS CORONARY ANGIOPLASTY: Chronic | ICD-10-CM

## 2025-05-23 DIAGNOSIS — G47.9 DISORDERED SLEEP: ICD-10-CM

## 2025-05-23 DIAGNOSIS — Z98.61 CAD S/P PERCUTANEOUS CORONARY ANGIOPLASTY: Chronic | ICD-10-CM

## 2025-05-23 DIAGNOSIS — I65.23 ATHEROSCLEROSIS OF BOTH CAROTID ARTERIES: ICD-10-CM

## 2025-05-23 NOTE — PROGRESS NOTES
Patient: Jose Francisco Small is a 64 y.o. male who presents today with the following Chief Complaint(s):  Chief Complaint   Patient presents with    Check-Up       History of Present Illness  The patient presents for follow up of diabetes and sleep disturbances.    Diabetes  - Reports increased blood glucose levels after switching from metformin to Ozempic.  - Previously took 4 metformin tablets daily   - Currently on Farxiga, and last dose of Ozempic (0.5 mg) was on Sunday.  - No side effects from Ozempic, but concerned about its efficacy.    Sleep Disturbances  - Experiences frequent awakenings at night (5 times/night) without needing to urinate.  - Maximum uninterrupted sleep is 2 hours, but can return to sleep promptly.  - No sleep study done; previous sleep medications were ineffective.  - Reports decreased energy levels despite an active lifestyle.    Hyperlipidemia  -Remains on Crestor 40 mg daily without side effects.  - Remains on for Vascepa BID.    No Known Allergies   Past Medical History:   Diagnosis Date    Anxiety     CAD (coronary artery disease)     Diabetes mellitus (HCC)     Hyperlipidemia     Hypertension     NSTEMI (non-ST elevated myocardial infarction) ([facility]) 07/20/2020    PVD (peripheral vascular disease)       Past Surgical History:   Procedure Laterality Date    CAROTID ENDARTERECTOMY Right 10/14/2021    RIGHT CAROTID ENDARTERECTOMY performed by Tomas Regalado II, MD at [facility] OR    CORONARY ANGIOPLASTY        Social History     Socioeconomic History    Marital status:      Spouse name: Not on file    Number of children: Not on file    Years of education: Not on file    Highest education level: Not on file   Occupational History    Not on file   Tobacco Use    Smoking status: Light Smoker     Current packs/day: 0.25     Average packs/day: 0.3 packs/day for 20.0 years (5.0 ttl pk-yrs)     Types: Cigarettes    Smokeless tobacco: Never   Vaping Use    Vaping status: Some Days   Substance and

## 2025-05-23 NOTE — PATIENT INSTRUCTIONS
For diabetes:  Metformin  mg 2 with breakfast and 2 with dinner.  Farxiga 10 mg 1 pill daily before breakfast  Glimepiride 1 mg 1 pill daily with breakfast.  REPLACING Ozemepic with Mounjaro 2.5 mg weekly  I will increase your dose of Mounjaro each month up to 10 mg.  Please let me know if you start having low sugars.     Mounjaro is very similar to Ozempic side-effect wise but is a more potent medicine and will do a better job lowering your blood sugar.     Mounjaro is also a weekly injection.     There is a coupon for Mounjaro at [email or web address] where you will pay about $25/month (or $25/3 months once we get you on a stable dose).

## 2025-05-24 NOTE — ASSESSMENT & PLAN NOTE
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  Patient with CEA of right carotid artery 10/14/2021 with Dr. Regalado.  Most recent carotid Dopplers were in May 2023 with 50 to 69% stenosis of the right ICA.  Scheduled for repeat carotid Dopplers 6/11/2025.

## 2025-05-24 NOTE — ASSESSMENT & PLAN NOTE
Uncontrolled with hemoglobin A1c of 9.2%.  Likely related to misunderstanding with patient inadvertently discontinuing metformin.  Resume metformin  mg 2 p.o. twice daily with meals.  Change Ozempic to Mounjaro 2.5 mg weekly with plans to increase dose each month up to 10 mg weekly.  Continue Farxiga 10 mg daily and glimepiride 1 mg daily with breakfast.  Plan will be to push dose of Mounjaro as tolerated and we glimepiride and metformin as his hemoglobin A1c improves.

## 2025-05-24 NOTE — ASSESSMENT & PLAN NOTE
Mildly elevated today.  Continue atenolol 50 mg daily and losartan 100/25 mg daily.  Monitor blood pressure.  Consider changing antihypertensives versus adding amlodipine.

## 2025-05-24 NOTE — ASSESSMENT & PLAN NOTE
LDL is at goal at 63.  Lipoprotein a is very high at 264.  He is already on maximum dose Crestor 40 mg daily with Zetia 10 mg daily and for Vascepa 0.5 mg twice daily.  Triglycerides are elevated but that likely reflects poor sugar control.  Would avoid adding fenofibrate at this time.  Consider PSK diet as he does have known heart disease.

## 2025-05-24 NOTE — ASSESSMENT & PLAN NOTE
Patient reports waking up multiple times throughout the night and then quickly falling back to sleep.  He is wondering if this is related to low testosterone.  Discussed with patient my bigger concern is that he has obstructive sleep apnea.  Recommend sleep study which he declines.  Starting Mounjaro for diabetes which should also help if he does have obstructive sleep apnea.

## 2025-05-24 NOTE — ASSESSMENT & PLAN NOTE
Asymptomatic.  Remains on Plavix 75 mg daily, aspirin 81 mg daily, atenolol 50 mg daily, losartan /25 mg daily, Farxiga 10 mg daily, Crestor 20 mg daily, Zetia 10 mg daily, and Vascepa 0.5 mg twice daily.  Not currently following with cardiology but likely needs to.

## 2025-06-12 ENCOUNTER — TELEPHONE (OUTPATIENT)
Dept: PHARMACY | Facility: CLINIC | Age: 65
End: 2025-06-12

## 2025-06-12 NOTE — TELEPHONE ENCOUNTER
CLINICAL PHARMACY NOTE - [facility] Referral    Patient can be scheduled with:  Charly Lynn (Andy)-  [facility] Providers: Natty Latham, Malik James, Dottie Ball, Debbie Barrera, Wilfredo Watson    Received a referral from: Provider: Dr. Barrera  to discuss patient’s medications. Called patient to schedule a time to speak with a pharmacist over the telephone.     No answer left VM: Please contact us at  [phone] option 1 to schedule this appointment.    Eva Bustillo CP.   [facility] Clinical   [facility]  Toll free: [phone] Option 1

## 2025-06-16 ENCOUNTER — TELEPHONE (OUTPATIENT)
Dept: VASCULAR SURGERY | Age: 65
End: 2025-06-16

## 2025-06-16 DIAGNOSIS — I65.23 CAROTID ATHEROSCLEROSIS, BILATERAL: Primary | ICD-10-CM

## 2025-06-16 NOTE — TELEPHONE ENCOUNTER
2nd attempt to contact this patient regarding the previous message  CLINICAL PHARMACY: REFERRAL  Patient unavailable at the time of call. Left following message on home TAD: please call back at toll-free [phone] to retrieve previous message.    [employer] message sent to patient.  If unread, letter will be mailed to home.     Eva Bustillo CP.   [facility] Clinical   [place] [facility]  Toll free: [phone] Option 1     For Pharmacy Admin Tracking Only    Program: [facility] [employer]  CPA in place:  No  Gap Closed?: No   Time Spent (min): 15

## 2025-06-23 DIAGNOSIS — E11.9 TYPE 2 DIABETES MELLITUS WITHOUT COMPLICATION, WITHOUT LONG-TERM CURRENT USE OF INSULIN (HCC): ICD-10-CM

## 2025-06-23 DIAGNOSIS — I25.10 CORONARY ARTERY DISEASE WITHOUT ANGINA PECTORIS, UNSPECIFIED VESSEL OR LESION TYPE, UNSPECIFIED WHETHER NATIVE OR TRANSPLANTED HEART: ICD-10-CM

## 2025-06-23 DIAGNOSIS — I10 ESSENTIAL HYPERTENSION: ICD-10-CM

## 2025-06-23 DIAGNOSIS — E11.65 TYPE 2 DIABETES MELLITUS WITH HYPERGLYCEMIA, WITHOUT LONG-TERM CURRENT USE OF INSULIN (HCC): ICD-10-CM

## 2025-06-23 RX ORDER — ATENOLOL 50 MG/1
50 TABLET ORAL DAILY
Qty: 90 TABLET | Refills: 0 | OUTPATIENT
Start: 2025-06-23

## 2025-06-26 ENCOUNTER — PATIENT MESSAGE (OUTPATIENT)
Dept: INTERNAL MEDICINE CLINIC | Age: 65
End: 2025-06-26

## 2025-06-26 DIAGNOSIS — I10 ESSENTIAL HYPERTENSION: ICD-10-CM

## 2025-06-26 DIAGNOSIS — E11.65 TYPE 2 DIABETES MELLITUS WITH HYPERGLYCEMIA, WITHOUT LONG-TERM CURRENT USE OF INSULIN (HCC): ICD-10-CM

## 2025-06-26 DIAGNOSIS — I25.10 CORONARY ARTERY DISEASE WITHOUT ANGINA PECTORIS, UNSPECIFIED VESSEL OR LESION TYPE, UNSPECIFIED WHETHER NATIVE OR TRANSPLANTED HEART: ICD-10-CM

## 2025-06-26 DIAGNOSIS — E11.9 TYPE 2 DIABETES MELLITUS WITHOUT COMPLICATION, WITHOUT LONG-TERM CURRENT USE OF INSULIN (HCC): ICD-10-CM

## 2025-06-27 RX ORDER — ATENOLOL 50 MG/1
50 TABLET ORAL DAILY
Qty: 90 TABLET | Refills: 3 | Status: SHIPPED | OUTPATIENT
Start: 2025-06-27

## 2025-07-16 DIAGNOSIS — E11.9 TYPE 2 DIABETES MELLITUS WITHOUT COMPLICATION, WITHOUT LONG-TERM CURRENT USE OF INSULIN (HCC): ICD-10-CM

## 2025-07-16 DIAGNOSIS — E11.65 TYPE 2 DIABETES MELLITUS WITH HYPERGLYCEMIA, WITHOUT LONG-TERM CURRENT USE OF INSULIN (HCC): ICD-10-CM

## 2025-07-16 NOTE — TELEPHONE ENCOUNTER
Last OV: 5/23/2025  Next OV: Visit date not found    Next appointment due:9/23/2025    Last fill:6/26/2025  Refills:0      OK to fill

## 2025-07-18 RX ORDER — TIRZEPATIDE 5 MG/.5ML
INJECTION, SOLUTION SUBCUTANEOUS
Qty: 2 ML | Refills: 1 | Status: SHIPPED | OUTPATIENT
Start: 2025-07-18

## 2025-08-27 ENCOUNTER — PATIENT MESSAGE (OUTPATIENT)
Dept: INTERNAL MEDICINE CLINIC | Age: 65
End: 2025-08-27

## 2025-08-27 DIAGNOSIS — E11.9 TYPE 2 DIABETES MELLITUS WITHOUT COMPLICATION, WITHOUT LONG-TERM CURRENT USE OF INSULIN (HCC): ICD-10-CM

## 2025-08-27 DIAGNOSIS — I25.10 CORONARY ARTERY DISEASE WITHOUT ANGINA PECTORIS, UNSPECIFIED VESSEL OR LESION TYPE, UNSPECIFIED WHETHER NATIVE OR TRANSPLANTED HEART: ICD-10-CM

## 2025-08-29 RX ORDER — DAPAGLIFLOZIN 10 MG/1
10 TABLET, FILM COATED ORAL EVERY MORNING
Qty: 90 TABLET | Refills: 1 | Status: SHIPPED | OUTPATIENT
Start: 2025-08-29

## (undated) DEVICE — PROTECTOR EYE PT SELF ADH NS OPT GRD LF

## (undated) DEVICE — HEADREST POS OD9IN THICKNESS 2IN RASPBERRY FOAM RNG CUSH

## (undated) DEVICE — FOGARTY - HYDRAGRIP SURGICAL - CLAMP INSERTS: Brand: FOGARTY SOFTJAW

## (undated) DEVICE — TOWEL,OR,DSP,ST,WHITE,DLX,XR,4/PK,20PK/C: Brand: MEDLINE

## (undated) DEVICE — CLIP LIG M BLU TI HRT SHP WIRE HORZ 180 PER BX

## (undated) DEVICE — MERCY FAIRFIELD TURNOVER KIT: Brand: MEDLINE INDUSTRIES, INC.

## (undated) DEVICE — BLADE CLIPPER GEN PURP NS

## (undated) DEVICE — DECANTER FLD 9IN ST BG FOR ASEP TRNSF OF FLD

## (undated) DEVICE — ADHESIVE SKIN CLOSURE TOP 36 CC HI VISC DERMBND MINI

## (undated) DEVICE — SHEET,DRAPE,53X77,STERILE: Brand: MEDLINE

## (undated) DEVICE — SOLUTION IRRIG 1000ML 0.9% SOD CHL USP POUR PLAS BTL

## (undated) DEVICE — INTENDED FOR TISSUE SEPARATION, AND OTHER PROCEDURES THAT REQUIRE A SHARP SURGICAL BLADE TO PUNCTURE OR CUT.: Brand: BARD-PARKER ® STAINLESS STEEL BLADES

## (undated) DEVICE — SUTURE MCRYL + SZ 4-0 L27IN ABSRB UD L19MM PS-2 3/8 CIR MCP426H

## (undated) DEVICE — 3M™ IOBAN™ 2 ANTIMICROBIAL INCISE DRAPE 6650EZ: Brand: IOBAN™ 2

## (undated) DEVICE — DRAPE ADOLESCENT  LAPAROTOMY

## (undated) DEVICE — CLIP INT SM WIDE RED TI TRNSVRS GRV CHEVRON SHP W/ PRECIS

## (undated) DEVICE — SUTURE PROL SZ 6 0 L24IN NONABSORBABLE BLU C 1 L13MM 3 8 CIR EP8726H

## (undated) DEVICE — INTENDED TO BE USED TO OCCLUDE, RETRACT AND IDENTIFY ARTERIES, VEINS, TENDONS AND NERVES IN SURGICAL PROCEDURES: Brand: STERION®  VESSEL LOOP

## (undated) DEVICE — APPLICATOR PREP 26ML 0.7% IOD POVACRYLEX 74% ISO ALC ST

## (undated) DEVICE — SUTURE PERMAHAND SZ 2-0 L12X18IN NONABSORBABLE BLK SILK A185H

## (undated) DEVICE — SYRINGE TB 1ML NDL 27GA L0.5IN PLAS SLIP TIP CONVENTIONAL

## (undated) DEVICE — SUTURE PERMAHAND SZ 4-0 L18IN NONABSORBABLE BLK SILK BRAID A183H

## (undated) DEVICE — SUTURE N ABSRB MONOFILAMENT 6-0 BV1 24 IN DA BLU PROLENE EP8805H

## (undated) DEVICE — GOWN SIRUS NONREIN XL W/TWL: Brand: MEDLINE INDUSTRIES, INC.

## (undated) DEVICE — GAUZE,SPONGE,4"X4",16PLY,XRAY,STRL,LF: Brand: MEDLINE

## (undated) DEVICE — SYRINGE, LUER LOCK, 10ML: Brand: MEDLINE

## (undated) DEVICE — BLADE ES ELASTOMERIC COAT INSUL DURABLE BEND UPTO 90DEG

## (undated) DEVICE — MAJOR SET UP PK

## (undated) DEVICE — LOOP,VESSEL,MAXI,BLUE,2/PK,STERILE: Brand: MEDLINE

## (undated) DEVICE — STERILE COTTON BALLS LARGE 5/P: Brand: MEDLINE

## (undated) DEVICE — SUTURE BOOT: Brand: DEROYAL

## (undated) DEVICE — LOTION PREP REMV 5OZ IODO CLR TINC OF BENZ DURAPREP

## (undated) DEVICE — 8F PRUITT F3 OUTLYING SHUNT WITH T-PORT: Brand: PRUITT F3 CAROTID SHUNT

## (undated) DEVICE — SUTURE VCRL + SZ 3-0 L36IN ABSRB UD L36MM CT-1 1/2 CIR VCP944H

## (undated) DEVICE — 20 ML SYRINGE LUER-LOCK TIP: Brand: MONOJECT

## (undated) DEVICE — STERILE LATEX POWDER FREE SURGICAL GLOVES WITH HYDROGEL COATING: Brand: PROTEXIS

## (undated) DEVICE — ELECTRODE PT RET AD L9FT HI MOIST COND ADH HYDRGEL CORDED

## (undated) DEVICE — PROVE COVER: Brand: UNBRANDED